# Patient Record
Sex: FEMALE | Race: WHITE
[De-identification: names, ages, dates, MRNs, and addresses within clinical notes are randomized per-mention and may not be internally consistent; named-entity substitution may affect disease eponyms.]

---

## 2017-01-11 ENCOUNTER — HOSPITAL ENCOUNTER (OUTPATIENT)
Dept: HOSPITAL 53 - M LAB | Age: 52
End: 2017-01-11
Attending: THORACIC SURGERY (CARDIOTHORACIC VASCULAR SURGERY)
Payer: COMMERCIAL

## 2017-01-11 DIAGNOSIS — Z01.818: Primary | ICD-10-CM

## 2017-01-11 LAB
ANION GAP SERPL CALC-SCNC: 7 MEQ/L (ref 8–16)
BASE EXCESS BLDA CALC-SCNC: 2 MMOL/L (ref -2–2)
BUN SERPL-MCNC: 17 MG/DL (ref 7–18)
CALCIUM SERPL-MCNC: 9.4 MG/DL (ref 8.5–10.1)
CHLORIDE SERPL-SCNC: 104 MEQ/L (ref 98–107)
CO2 BLDA CALC-SCNC: 28.1 MEQ/L (ref 22–29)
CO2 SERPL-SCNC: 31 MEQ/L (ref 21–32)
CREAT SERPL-MCNC: 0.84 MG/DL (ref 0.55–1.02)
ERYTHROCYTE [DISTWIDTH] IN BLOOD BY AUTOMATED COUNT: 13.5 % (ref 11.5–14.5)
GFR SERPL CREATININE-BSD FRML MDRD: > 60 ML/MIN/{1.73_M2} (ref 58–?)
GLUCOSE SERPL-MCNC: 114 MG/DL (ref 70–105)
HCO3 BLDA-SCNC: 26.8 MEQ/L (ref 22–26)
HCO3 STD BLDA-SCNC: 26.2 MEQ/L (ref 22–26)
INR PPP: 0.93
MCH RBC QN AUTO: 32.5 PG (ref 27–33)
MCHC RBC AUTO-ENTMCNC: 33.4 G/DL (ref 32–36.5)
MCV RBC AUTO: 97.3 FL (ref 80–96)
PCO2 BLDA: 42.7 MMHG (ref 35–45)
PH BLDA: 7.42 UNITS (ref 7.35–7.45)
PLATELET # BLD AUTO: 150 K/MM3 (ref 150–450)
PO2 BLDA: 94.2 MMHG (ref 75–100)
POTASSIUM SERPL-SCNC: 4.6 MEQ/L (ref 3.5–5.1)
SODIUM SERPL-SCNC: 142 MEQ/L (ref 136–145)
WBC # BLD AUTO: 6.2 K/MM3 (ref 4–10)

## 2017-01-11 NOTE — ECGEPIP
Stationary ECG Study

                              [facility]

                                       

                                       Test Date:    2017

Pat Name:     DARRYL GOLDMAN                Department:   

Patient ID:   [id number]                 Room:         -

Gender:       F                        Technician:   DENZEL

:          1965               Requested By: Tan AGUAYO

Order Number: [id number]     Reading MD:   Goyo Dunbar

                                 Measurements

Intervals                              Axis          

Rate:         64                       P:            61

NY:           155                      QRS:          60

QRSD:         86                       T:            41

QT:           408                                    

QTc:          421                                    

                           Interpretive Statements

SINUS RHYTHM

Comparison tracing not on file

Electronically Signed On 2017 11:12:40 EST by Goyo Dunbar

## 2017-01-11 NOTE — REP
Clinical:  Preoperative assessment .

 

Comparison: 10/16/2015 .

 

Technique:  PA and lateral.

 

Findings:

The mediastinum and cardiac silhouette are normal.  Vvozso-K-Baat with tip in the

SVC.  The lung fields are clear and without acute consolidation, effusion, or

pneumothorax.  The skeletal structures are intact and normal.

 

Impression:

1.   No acute cardiopulmonary process.

 

 

Signed by

Tani Hayden MD 01/11/2017 09:20 A

## 2017-01-16 ENCOUNTER — HOSPITAL ENCOUNTER (OUTPATIENT)
Dept: HOSPITAL 53 - M RAD | Age: 52
End: 2017-01-16
Attending: THORACIC SURGERY (CARDIOTHORACIC VASCULAR SURGERY)
Payer: COMMERCIAL

## 2017-01-16 DIAGNOSIS — R91.8: Primary | ICD-10-CM

## 2017-01-16 NOTE — REP
CT study of the chest with IV contrast:

 

History:  Ampullary carcinoma with lung metastases.

 

Comparison is made with prior studies.  The most recent of these is a chest CT

study from September 26, 2016.  Comparison is also made with August 2, 2016, May

23, 2016, and April 8, 2016.

 

CT findings:  There is a right internal jugular vein Gmwfoy-C-Bnku catheter.

There is no evidence of pleural or pericardial effusion.  No mediastinal

adenopathy or hilar adenopathy is appreciated.  No adrenal mass lesion is seen.

There are clips and sutures in the upper abdomen.  No hepatic mass lesion is

seen.  Bone window settings show no bony destructive lesion.

 

There are however innumerable metastatic pulmonary nodules.  The largest of these

is a somewhat spiculated mass in the left lower lobe.  This measures 2.7 cm by

2.3 cm today.  On September 26, 2016 study it measured 2.3 by 1.8 cm. The nearby

tumor deposit adjacent to the left posterolateral pericardium in the left lower

lobe is again seen.  This is probably unchanged.

 

There are numerous other nodules which are increased in size compared to the most

recent prior study of September 26, 2016.  These include a nodule in the right

middle lobe, which measures 10 mm today and was previously 7 mm.  This is seen on

page 62 of 108 of series 301 of today's exam. There is a 6 mm nodule in the left

lower lobe on image 76 of 108 in series 301 of today's examination which was 3 mm

in greatest diameter in September 2016.  There are multiple other nodules which

have grown similarly.  I do not see any definitely new nodules.

 

Impression:

 

Progressive pulmonary metastatic disease.  Innumerable pulmonary nodules are

seen.  Numerous nodules have increased in size since the most recent prior study

of September 26, 2016.

 

 

Signed by

Yaya Chauhan MD 01/17/2017 08:00 A

## 2017-01-31 ENCOUNTER — HOSPITAL ENCOUNTER (EMERGENCY)
Dept: HOSPITAL 53 - M ED | Age: 52
Discharge: HOME | End: 2017-01-31
Payer: COMMERCIAL
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
## 2017-01-31 DIAGNOSIS — Z91.041: ICD-10-CM

## 2017-01-31 DIAGNOSIS — Z79.899: ICD-10-CM

## 2017-01-31 DIAGNOSIS — C25.9: ICD-10-CM

## 2017-01-31 DIAGNOSIS — Z87.891: ICD-10-CM
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
## 2017-01-31 DIAGNOSIS — T45.1X5A: ICD-10-CM
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
## 2017-01-31 DIAGNOSIS — R22.0: Primary | ICD-10-CM

## 2017-01-31 DIAGNOSIS — C34.90: ICD-10-CM

## 2017-01-31 NOTE — EDDOCDS
Physician Documentation                                                                           

[facility]                                                                         

Name: Florence Cotter                                                                                   

Age: 51 yrs                                                                                       

Sex: Female                                                                                       

: 1965                                                                                   

MRN: [id number]                                                                                     

Arrival Date: 2017                                                                          

Time: 15:18                                                                                       

Account#: [id number]                                                                              

Bed 8                                                                                             

Private MD: Aarti Hernandez P.                                                                  

Disposition:                                                                                      

17 17:31 Discharged to Home/Self Care. Impression: Allergy status to other drugs,           

medicaments and biological substances status.                                                   

- Condition is Stable.                                                                            

- Discharge Instructions: Allergies.                                                              

- Prescriptions for Benadryl 25 mg Oral Capsule - take 1 capsule by ORAL route every 6            

hours As needed; 30 tablet. Prednisone 20 mg Oral Tablet - take 1 tablet by ORAL                

route once daily for 5 days; 5 tablet.                                                          

- Medication Reconciliation, Local Pharmacy Hours form.                                           

- Follow up: Aarti Hernandez; When: Tomorrow; Reason: Recheck today's complaints,                

Continuance of care.                                                                            

- Problem is an acute exacerbation.                                                               

- Symptoms have improved.                                                                         

                                                                                                  

                                                                                                  

                                                                                                  

Historical:                                                                                       

- Allergies: IV Dyepossibly;                                                                      

- Home Meds:                                                                                      

1. Protonix 40 mg Oral grps 1 packet 2 times per day                                            

2. prochlorperazine maleate 10 mg Oral tab 1 tab 3 times per day                                

3. chemo regimen today *                                                                        

- PMHx: Cancer, Lung - Left; Cancer, Pancreas;                                                    

- PSHx: wipple procedure;                                                                         

- Social history: Smoking status: Patient states former smoker of tobacco. No barriers            

to communication noted, The patient speaks fluent English, Speaks appropriately for             

age.                                                                                            

- Family history: No immediate family members are acutely ill.                                    

- : The pt / caregiver states he / she is not on anticoagulants. Home medication list             

is obtained from the patient.                                                                   

- Exposure Risk Screening:: None identified.                                                      

                                                                                                  

                                                                                                  

Vital Signs:                                                                                      

                                                                                             

15:20  / 93; Pulse 83; Resp 20; Temp 97.7; Pulse Ox 100% on R/A; Weight 63.5 kg /       elp 

      139.99 lbs (R); Height 5 ft. 6 in. (167.64 cm) (R);                                         

18:01  / 76; Pulse 84; Resp 17; Temp 98.3(T); Pulse Ox 99% ;                            mb9 

15:20 Body Mass Index 22.60 (63.50 kg, 167.64 cm)                                             elp 

                                                                                                  

MDM:                                                                                              

16:18 diphenhydrAMINE 25 mg PO once ordered.                                                  sebastian  

16:18 predniSONE 20 mg PO once; administer with food or milk ordered.                         sebastian  

17:22 Financial registration complete.                                                        [email or web address] 

18:01 heparin 100units/mL flush (infusaport) 5 ml IVP once; flush first with 10mL of NS       mb9 

      followed by heparin ordered.                                                                

                                                                                                  

Administered Medications:                                                                         

16:50 Drug: diphenhydrAMINE 25 mg [diphenhydramine 25 mg capsule (1 caps)] Route: PO;         mb9 

16:51 Drug: predniSONE 20 mg [prednisone 5 mg tablet (4 tabs)] Route: PO;                     mb9 

18:01 Drug: heparin 100units/mL flush (infusaport) 5 ml [heparin, porcine (PF) 10 unit/mL     mb9 

      intravenous syringe (5 mL)] Route: IVP; Site: Implantable Access Device;                    

                                                                                                  

                                                                                                  

Signatures:                                                                                       

Bam Mcarthur, FNP                       FNP  Vee Dennison RN                    RN   hs1                                                  

Giovanni Andrade RN                       RN   mb9                                                  

Renetta Ernst                               [facility]                                                  

                                                                                                  

**************************************************************************************************

JESS

## 2017-01-31 NOTE — EDDOCDS
Nurse's Notes                                                                                     

[facility]                                                                         

Name: Florence Cotter                                                                                   

Age: 51 yrs                                                                                       

Sex: Female                                                                                       

: 1965                                                                                   

MRN: [id number]                                                                                     

Arrival Date: 2017                                                                          

Time: 15:18                                                                                       

Account#: [id number]                                                                              

Bed 8                                                                                             

Private MD: Aarti Hernandez P.                                                                  

Diagnosis: Allergy status to other drugs, medicaments and biological substances status            

                                                                                                  

Presentation:                                                                                     

                                                                                             

15:25 Presenting complaint: Patient states: is being treated at present by oncology and is    hs1 

      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 Patient states that her          

      tongue feels funny and that her lips feel tingly. Patient concerned for allergic            

      reaction. Patient has paused Chemo regimen machine at present - infusaport noted to         

      left upper chest. Onset: The symptoms/episode began/occurred suddenly. The patient has      

      a history of a previous allergic reaction. The previous allergic reaction was               

      localized. Anaphylaxis evaluation, the patient reports or I have noted the following        

      symptoms which indicate a significant risk of anaphylaxis: no signs or symptoms of          

      anaphylaxis were noted. Adult Sepsis Screening: The patient does not have new or            

      worsening altered mentation. Patient's respiratory rate is less than 22. Systolic blood     

      pressure is greater than 100. Patient has a qSOFA score of 0- Negative Sepsis Screen.       

      Suicide/Homicide risk assessment- the patient denies having any suicidal and/or             

      homicidal ideations and does not present with any other emotional, behavioral or mental     

      health complaints.  Status: Patient is not a  or              

      dependent. Transition of care: patient was not received from another setting of care.       

15:25 Acuity: CARINE Level 3                                                                     hs1 

15:25 Method Of Arrival: Walkin/Carried/Asstd                                                 hs1 

                                                                                                  

Triage Assessment:                                                                                

15:32 General: Appears in no apparent distress, Behavior is anxious, cooperative. Pain:       hs1 

      Location: throat Quality of pain is described as razor blades. HIV screening NA for         

      this visit Offered previously. Respiratory: Reports no respiratory complaints.              

                                                                                                  

Historical:                                                                                       

- Allergies: IV Dyepossibly;                                                                      

- Home Meds:                                                                                      

1. Protonix 40 mg Oral grps 1 packet 2 times per day                                            

2. prochlorperazine maleate 10 mg Oral tab 1 tab 3 times per day                                

3. chemo regimen today *                                                                        

- PMHx: Cancer, Lung - Left; Cancer, Pancreas;                                                    

- PSHx: wipple procedure;                                                                         

- Social history: Smoking status: Patient states former smoker of tobacco. No barriers            

to communication noted, The patient speaks fluent English, Speaks appropriately for             

age.                                                                                            

- Family history: No immediate family members are acutely ill.                                    

- : The pt / caregiver states he / she is not on anticoagulants. Home medication list             

is obtained from the patient.                                                                   

- Exposure Risk Screening:: None identified.                                                      

                                                                                                  

                                                                                                  

Screenin:01 Screening information is obtained from the patient. Fall risk: No risks identified.     mb9 

      Assistance ADL's: requires no assistance with activities of daily living. Abuse/DV          

      Screen: The patient / caregiver reports he/she is: not in a situation that causes fear,     

      pain or injury. Nutritional screening: No deficits noted. Advance Directives: There is      

      no active DNR order. home support is adequate.                                              

                                                                                                  

Assessment:                                                                                       

15:50 General: Appears in no apparent distress, Behavior is appropriate for age, cooperative. mb9 

      Pain: Denies pain. Respiratory: Airway is patent Respiratory effort is even, unlabored,     

      Breath sounds are clear bilaterally. Derm: Reports pt states, "it felt like my tongue       

      was swollen and it felt like razor blades when I would swallow. It feels like its           

      better now".                                                                                

17:08 Reassessment: Patient appears in no apparent distress at this time. Patient states      mb9 

      symptoms have improved. General: Behavior is fussy. Pain: Denies pain. Respiratory:         

      Airway is patent Respiratory effort is even, unlabored.                                     

18:00 Reassessment: Patient appears in no apparent distress at this time. Patient states      mb9 

      symptoms have improved. Adult Sepsis Screening: The patient does not have new or            

      worsening altered mentation. Patient's respiratory rate is less than 22. Systolic blood     

      pressure is greater than 100. Patient has a qSOFA score of 0- Negative Sepsis Screen.       

      General: Appears in no apparent distress, Behavior is appropriate for age, cooperative.     

      Respiratory: Airway is patent Respiratory effort is even, unlabored.                        

                                                                                                  

Vital Signs:                                                                                      

15:20  / 93; Pulse 83; Resp 20; Temp 97.7; Pulse Ox 100% on R/A; Weight 63.5 kg (R);    elp 

      Height 5 ft. 6 in. (167.64 cm) (R);                                                         

18:01  / 76; Pulse 84; Resp 17; Temp 98.3(T); Pulse Ox 99% ;                            mb9 

15:20 Body Mass Index 22.60 (63.50 kg, 167.64 cm)                                             elp 

                                                                                                  

Vitals:                                                                                           

15:20 Log In Time: 2017 at 15:18. RN notified that patient meets Red Flag         elp 

      criteria.                                                                                   

                                                                                                  

ED Course:                                                                                        

15:20 Patient visited by Rhona Solis PCA.                                                  elp 

15:20 Aarti Hernandez is Private Physician.                                                  elp 

15:20 Patient moved to Waiting                                                                elp 

15:21 Patient visited by Rhona Solis PCA.                                                  elp 

15:22 Ambar Smart,RN is Primary Nurse.                                                         elp 

15:22 Patient moved to 8                                                                      elp 

15:29 Triage Initiated                                                                        hs1 

15:53 [facility]                                                              ke  

15:53 Patient visited by Bam Mcarthur FNP.                                                   ke  

15:53 Patient visited by aBm Mcarthur FNP.                                                   ke  

16:23 Patient visited by Bam Mcarthur FNP.                                                   ke  

16:58 Patient visited by Bam Mcarthur FNP.                                                   ke  

17:30 Patient visited by Bam Mcarthur FNP.                                                   ke  

17:31 Aarti Hernandez is Referral Physician.                                                 ke  

18:01 The patient / caregiver is instructed regarding the plan of care and ED course. Patient mb9 

      has correct armband on for positive identification.                                         

18:01 Discontinued pt had 20 gauge degroot neeble in place to infusaport to left chest. site    mb9 

      discontinued at this time. No procedures done that require assistance.                      

                                                                                                  

Administered Medications:                                                                         

16:50 Drug: diphenhydrAMINE 25 mg [diphenhydramine 25 mg capsule (1 caps)] Route: PO;         mb9 

16:51 Drug: predniSONE 20 mg [prednisone 5 mg tablet (4 tabs)] Route: PO;                     mb9 

18:01 Drug: heparin 100units/mL flush (infusaport) 5 ml [heparin, porcine (PF) 10 unit/mL     mb9 

      intravenous syringe (5 mL)] Route: IVP; Site: Implantable Access Device;                    

                                                                                                  

                                                                                                  

Order Results:                                                                                    

There are currently no results for this order.                                                    

Outcome:                                                                                          

17:31 Discharge ordered by Provider.                                                          ke  

18:01 Discharge Assessment: Patient awake, alert and oriented x 3. No cognitive and/or        mb9 

      functional deficits noted. Patient verbalized understanding of disposition                  

      instructions. patient administered narcotics - no. The following High Risk Discharge        

      criteria are identified: None. Discharged to home ambulatory. Condition: good               

      Condition: stable Condition: improved. Discharge instructions given to patient,             

      Instructed on discharge instructions, follow up and referral plans. medication usage,       

      Demonstrated understanding of instructions, medications, Pt was receptive of discharge      

      instructions/ teaching. Prescriptions given X 2. No special radiology studies were          

      completed. Property :Personal belongings accompany Pt.                                      

18:05 Patient left the ED.                                                                    mb9 

                                                                                                  

Signatures:                                                                                       

Bam Mcarthur FNP FNP ke Sherrill, Hannah, RN                    RN   hs1                                                  

Rhona Solis PCA PCA elp Belles, Michael,ANGELA                       RN   mb9                                                  

                                                                                                  

**************************************************************************************************

MTDD

## 2017-02-02 NOTE — EDDOCDS
Nurse's Notes                                                                                     

[facility]                                                                         

Name: Florence Cotter                                                                                   

Age: 51 yrs                                                                                       

Sex: Female                                                                                       

: 1965                                                                                   

MRN: [id number]                                                                                     

Arrival Date: 2017                                                                          

Time: 15:18                                                                                       

Account#: [id number]                                                                              

Bed 8                                                                                             

Private MD: Aarti Hernandez P.                                                                  

Diagnosis: Allergy status to other drugs, medicaments and biological substances status            

                                                                                                  

Presentation:                                                                                     

                                                                                             

15:25 Presenting complaint: Patient states: is being treated at present by oncology and is    hs1 

      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 Patient states that her          

      tongue feels funny and that her lips feel tingly. Patient concerned for allergic            

      reaction. Patient has paused Chemo regimen machine at present - infusaport noted to         

      left upper chest. Onset: The symptoms/episode began/occurred suddenly. The patient has      

      a history of a previous allergic reaction. The previous allergic reaction was               

      localized. Anaphylaxis evaluation, the patient reports or I have noted the following        

      symptoms which indicate a significant risk of anaphylaxis: no signs or symptoms of          

      anaphylaxis were noted. Adult Sepsis Screening: The patient does not have new or            

      worsening altered mentation. Patient's respiratory rate is less than 22. Systolic blood     

      pressure is greater than 100. Patient has a qSOFA score of 0- Negative Sepsis Screen.       

      Suicide/Homicide risk assessment- the patient denies having any suicidal and/or             

      homicidal ideations and does not present with any other emotional, behavioral or mental     

      health complaints.  Status: Patient is not a  or              

      dependent. Transition of care: patient was not received from another setting of care.       

15:25 Acuity: CARINE Level 3                                                                     hs1 

15:25 Method Of Arrival: Walkin/Carried/Asstd                                                 hs1 

                                                                                                  

Triage Assessment:                                                                                

15:32 General: Appears in no apparent distress, Behavior is anxious, cooperative. Pain:       hs1 

      Location: throat Quality of pain is described as razor blades. HIV screening NA for         

      this visit Offered previously. Respiratory: Reports no respiratory complaints.              

                                                                                                  

Historical:                                                                                       

- Allergies: IV Dyepossibly;                                                                      

- Home Meds:                                                                                      

1. Protonix 40 mg Oral grps 1 packet 2 times per day                                            

2. prochlorperazine maleate 10 mg Oral tab 1 tab 3 times per day                                

3. chemo regimen today *                                                                        

- PMHx: Cancer, Lung - Left; Cancer, Pancreas;                                                    

- PSHx: wipple procedure;                                                                         

- Social history: Smoking status: Patient states former smoker of tobacco. No barriers            

to communication noted, The patient speaks fluent English, Speaks appropriately for             

age.                                                                                            

- Family history: No immediate family members are acutely ill.                                    

- : The pt / caregiver states he / she is not on anticoagulants. Home medication list             

is obtained from the patient.                                                                   

- Exposure Risk Screening:: None identified.                                                      

                                                                                                  

                                                                                                  

Screenin:01 Screening information is obtained from the patient. Fall risk: No risks identified.     mb9 

      Assistance ADL's: requires no assistance with activities of daily living. Abuse/DV          

      Screen: The patient / caregiver reports he/she is: not in a situation that causes fear,     

      pain or injury. Nutritional screening: No deficits noted. Advance Directives: There is      

      no active DNR order. home support is adequate.                                              

                                                                                                  

Assessment:                                                                                       

15:50 General: Appears in no apparent distress, Behavior is appropriate for age, cooperative. mb9 

      Pain: Denies pain. Respiratory: Airway is patent Respiratory effort is even, unlabored,     

      Breath sounds are clear bilaterally. Derm: Reports pt states, "it felt like my tongue       

      was swollen and it felt like razor blades when I would swallow. It feels like its           

      better now".                                                                                

17:08 Reassessment: Patient appears in no apparent distress at this time. Patient states      mb9 

      symptoms have improved. General: Behavior is fussy. Pain: Denies pain. Respiratory:         

      Airway is patent Respiratory effort is even, unlabored.                                     

18:00 Reassessment: Patient appears in no apparent distress at this time. Patient states      mb9 

      symptoms have improved. Adult Sepsis Screening: The patient does not have new or            

      worsening altered mentation. Patient's respiratory rate is less than 22. Systolic blood     

      pressure is greater than 100. Patient has a qSOFA score of 0- Negative Sepsis Screen.       

      General: Appears in no apparent distress, Behavior is appropriate for age, cooperative.     

      Respiratory: Airway is patent Respiratory effort is even, unlabored.                        

                                                                                                  

Vital Signs:                                                                                      

15:20  / 93; Pulse 83; Resp 20; Temp 97.7; Pulse Ox 100% on R/A; Weight 63.5 kg (R);    elp 

      Height 5 ft. 6 in. (167.64 cm) (R);                                                         

18:01  / 76; Pulse 84; Resp 17; Temp 98.3(T); Pulse Ox 99% ;                            mb9 

15:20 Body Mass Index 22.60 (63.50 kg, 167.64 cm)                                             elp 

                                                                                                  

Vitals:                                                                                           

15:20 Log In Time: 2017 at 15:18. RN notified that patient meets Red Flag         elp 

      criteria.                                                                                   

                                                                                                  

ED Course:                                                                                        

15:20 Patient visited by Rhona Solis PCA.                                                  elp 

15:20 Aarti Hernandez is Private Physician.                                                  elp 

15:20 Patient moved to Waiting                                                                elp 

15:21 Patient visited by Rhona Solis PCA.                                                  elp 

15:22 Ambar Smart,RN is Primary Nurse.                                                         elp 

15:22 Patient moved to 8                                                                      elp 

15:29 Triage Initiated                                                                        hs1 

15:53 [facility]                                                              ke  

15:53 Patient visited by Bam Mcarthur FNP.                                                   ke  

15:53 Patient visited by Bam Mcarthur FNP.                                                   ke  

16:23 Patient visited by Bam Mcarthur FNP.                                                   ke  

16:58 Patient visited by Bam Mcarthur FNP.                                                   ke  

17:30 Patient visited by Bam Mcarthur FNP.                                                   ke  

17:31 Aarti Hernandez is Referral Physician.                                                 ke  

18:01 The patient / caregiver is instructed regarding the plan of care and ED course. Patient mb9 

      has correct armband on for positive identification.                                         

18:01 Discontinued pt had 20 gauge degroot neeble in place to infusaport to left chest. site    mb9 

      discontinued at this time. No procedures done that require assistance.                      

18:19 [facility] Payment Agreement was scanned into [employer] and attached to record.               [facility] 

                                                                                             

11:34 T-Sheet-- Draft Copy was scanned into [employer] and attached to record.                   gb  

                                                                                                  

Administered Medications:                                                                         

                                                                                             

16:50 Drug: diphenhydrAMINE 25 mg [diphenhydramine 25 mg capsule (1 caps)] Route: PO;         mb9 

16:51 Drug: predniSONE 20 mg [prednisone 5 mg tablet (4 tabs)] Route: PO;                     mb9 

18:01 Drug: heparin 100units/mL flush (infusaport) 5 ml [heparin, porcine (PF) 10 unit/mL     mb9 

      intravenous syringe (5 mL)] Route: IVP; Site: Implantable Access Device;                    

                                                                                                  

                                                                                                  

Order Results:                                                                                    

There are currently no results for this order.                                                    

Outcome:                                                                                          

17:31 Discharge ordered by Provider.                                                          ke  

18:01 Discharge Assessment: Patient awake, alert and oriented x 3. No cognitive and/or        mb9 

      functional deficits noted. Patient verbalized understanding of disposition                  

      instructions. patient administered narcotics - no. The following High Risk Discharge        

      criteria are identified: None. Discharged to home ambulatory. Condition: good               

      Condition: stable Condition: improved. Discharge instructions given to patient,             

      Instructed on discharge instructions, follow up and referral plans. medication usage,       

      Demonstrated understanding of instructions, medications, Pt was receptive of discharge      

      instructions/ teaching. Prescriptions given X 2. No special radiology studies were          

      completed. Property :Personal belongings accompany Pt.                                      

18:05 Patient left the ED.                                                                    mb9 

                                                                                                  

Signatures:                                                                                       

Bridget Pickens, Reg                  Reg  gb                                                   

Bam Mcarthur, FNP                       FNP  Vee Dennison RN                    RN   hs1                                                  

Rhona Solis, Giovanni Mike RN                       RN   mb9                                                  

Renetta Ernst                                                  

                                                                                                  

**************************************************************************************************



*** Chart Complete ***
JESS

## 2017-02-02 NOTE — EDDOCDS
Physician Documentation                                                                           

[facility]                                                                         

Name: Florence Cotter                                                                                   

Age: 51 yrs                                                                                       

Sex: Female                                                                                       

: 1965                                                                                   

MRN: [id number]                                                                                     

Arrival Date: 2017                                                                          

Time: 15:18                                                                                       

Account#: [id number]                                                                              

Bed 8                                                                                             

Private MD: Aarti Hernandez P.                                                                  

Disposition:                                                                                      

17 17:31 Discharged to Home/Self Care. Impression: Allergy status to other drugs,           

medicaments and biological substances status.                                                   

- Condition is Stable.                                                                            

- Discharge Instructions: Allergies.                                                              

- Prescriptions for Benadryl 25 mg Oral Capsule - take 1 capsule by ORAL route every 6            

hours As needed; 30 tablet. Prednisone 20 mg Oral Tablet - take 1 tablet by ORAL                

route once daily for 5 days; 5 tablet.                                                          

- Medication Reconciliation, Local Pharmacy Hours form.                                           

- Follow up: Aarti Hernandez; When: Tomorrow; Reason: Recheck today's complaints,                

Continuance of care.                                                                            

- Problem is an acute exacerbation.                                                               

- Symptoms have improved.                                                                         

                                                                                                  

                                                                                                  

                                                                                                  

Historical:                                                                                       

- Allergies: IV Dyepossibly;                                                                      

- Home Meds:                                                                                      

1. Protonix 40 mg Oral grps 1 packet 2 times per day                                            

2. prochlorperazine maleate 10 mg Oral tab 1 tab 3 times per day                                

3. chemo regimen today *                                                                        

- PMHx: Cancer, Lung - Left; Cancer, Pancreas;                                                    

- PSHx: wipple procedure;                                                                         

- Social history: Smoking status: Patient states former smoker of tobacco. No barriers            

to communication noted, The patient speaks fluent English, Speaks appropriately for             

age.                                                                                            

- Family history: No immediate family members are acutely ill.                                    

- : The pt / caregiver states he / she is not on anticoagulants. Home medication list             

is obtained from the patient.                                                                   

- Exposure Risk Screening:: None identified.                                                      

                                                                                                  

                                                                                                  

Vital Signs:                                                                                      

                                                                                             

15:20  / 93; Pulse 83; Resp 20; Temp 97.7; Pulse Ox 100% on R/A; Weight 63.5 kg /       elp 

      139.99 lbs (R); Height 5 ft. 6 in. (167.64 cm) (R);                                         

18:01  / 76; Pulse 84; Resp 17; Temp 98.3(T); Pulse Ox 99% ;                            mb9 

15:20 Body Mass Index 22.60 (63.50 kg, 167.64 cm)                                             elp 

                                                                                                  

MDM:                                                                                              

16:18 diphenhydrAMINE 25 mg PO once ordered.                                                  ke  

16:18 predniSONE 20 mg PO once; administer with food or milk ordered.                         ke  

17:22 Financial registration complete.                                                        b 

18:01 heparin 100units/mL flush (infusaport) 5 ml IVP once; flush first with 10mL of NS       mb9 

      followed by heparin ordered.                                                                

18:19 [facility] Payment Agreement was scanned into [employer] and attached to record.               [facility] [place] 

                                                                                             

11:34 T-Sheet-- Draft Copy was scanned into [employer] and attached to record.                   gb  

                                                                                                  

Administered Medications:                                                                         

                                                                                             

16:50 Drug: diphenhydrAMINE 25 mg [diphenhydramine 25 mg capsule (1 caps)] Route: PO;         mb9 

16:51 Drug: predniSONE 20 mg [prednisone 5 mg tablet (4 tabs)] Route: PO;                     mb9 

18:01 Drug: heparin 100units/mL flush (infusaport) 5 ml [heparin, porcine (PF) 10 unit/mL     mb9 

      intravenous syringe (5 mL)] Route: IVP; Site: Implantable Access Device;                    

                                                                                                  

                                                                                                  

Signatures:                                                                                       

Bridget Pickens, Reg                  Reg  gb                                                   

Bam Mcarthur, FNP                       FNP  Vee Dennison RN                    RN   hs1                                                  

Giovanni AndradeRN                       RN   mb9                                                  

Renetta Ernst                                                  

                                                                                                  

The chart was reviewed and I authenticate all verbal orders and agree with the evaluation and 
treatment provided.Attachments:

18:19 [facility] Payment Agreement                                                                [facility] [place] 

                                                                                             

11:34 T-Sheet-- Draft Copy                                                                    gb  

                                                                                                  

**************************************************************************************************



*** Chart Complete ***
MTDD

## 2017-02-02 NOTE — EDDOCDS
Physician Documentation                                                                           

[facility]                                                                         

Name: Florence Cotter                                                                                   

Age: 51 yrs                                                                                       

Sex: Female                                                                                       

: 1965                                                                                   

MRN: [id number]                                                                                     

Arrival Date: 2017                                                                          

Time: 15:18                                                                                       

Account#: [id number]                                                                              

Bed 8                                                                                             

Private MD: Aarti Hernandez P.                                                                  

Disposition:                                                                                      

17 17:31 Discharged to Home/Self Care. Impression: Allergy status to other drugs,           

medicaments and biological substances status.                                                   

- Condition is Stable.                                                                            

- Discharge Instructions: Allergies.                                                              

- Prescriptions for Benadryl 25 mg Oral Capsule - take 1 capsule by ORAL route every 6            

hours As needed; 30 tablet. Prednisone 20 mg Oral Tablet - take 1 tablet by ORAL                

route once daily for 5 days; 5 tablet.                                                          

- Medication Reconciliation, Local Pharmacy Hours form.                                           

- Follow up: Aarti Hernandez; When: Tomorrow; Reason: Recheck today's complaints,                

Continuance of care.                                                                            

- Problem is an acute exacerbation.                                                               

- Symptoms have improved.                                                                         

                                                                                                  

                                                                                                  

                                                                                                  

Historical:                                                                                       

- Allergies: IV Dyepossibly;                                                                      

- Home Meds:                                                                                      

1. Protonix 40 mg Oral grps 1 packet 2 times per day                                            

2. prochlorperazine maleate 10 mg Oral tab 1 tab 3 times per day                                

3. chemo regimen today *                                                                        

- PMHx: Cancer, Lung - Left; Cancer, Pancreas;                                                    

- PSHx: wipple procedure;                                                                         

- Social history: Smoking status: Patient states former smoker of tobacco. No barriers            

to communication noted, The patient speaks fluent English, Speaks appropriately for             

age.                                                                                            

- Family history: No immediate family members are acutely ill.                                    

- : The pt / caregiver states he / she is not on anticoagulants. Home medication list             

is obtained from the patient.                                                                   

- Exposure Risk Screening:: None identified.                                                      

                                                                                                  

                                                                                                  

Vital Signs:                                                                                      

                                                                                             

15:20  / 93; Pulse 83; Resp 20; Temp 97.7; Pulse Ox 100% on R/A; Weight 63.5 kg /       elp 

      139.99 lbs (R); Height 5 ft. 6 in. (167.64 cm) (R);                                         

18:01  / 76; Pulse 84; Resp 17; Temp 98.3(T); Pulse Ox 99% ;                            mb9 

15:20 Body Mass Index 22.60 (63.50 kg, 167.64 cm)                                             elp 

                                                                                                  

MDM:                                                                                              

16:18 diphenhydrAMINE 25 mg PO once ordered.                                                  ke  

16:18 predniSONE 20 mg PO once; administer with food or milk ordered.                         ke  

17:22 Financial registration complete.                                                        b 

18:01 heparin 100units/mL flush (infusaport) 5 ml IVP once; flush first with 10mL of NS       mb9 

      followed by heparin ordered.                                                                

18:19 [facility] Payment Agreement was scanned into [employer] and attached to record.               [facility] 

                                                                                             

11:34 T-Sheet-- Draft Copy was scanned into [employer] and attached to record.                   gb  

                                                                                                  

Administered Medications:                                                                         

                                                                                             

16:50 Drug: diphenhydrAMINE 25 mg [diphenhydramine 25 mg capsule (1 caps)] Route: PO;         mb9 

16:51 Drug: predniSONE 20 mg [prednisone 5 mg tablet (4 tabs)] Route: PO;                     mb9 

18:01 Drug: heparin 100units/mL flush (infusaport) 5 ml [heparin, porcine (PF) 10 unit/mL     mb9 

      intravenous syringe (5 mL)] Route: IVP; Site: Implantable Access Device;                    

                                                                                                  

                                                                                                  

Signatures:                                                                                       

Bridget Pickens, Reg                  Reg  gb                                                   

Bam Mcarthur, FNP                       FNP  Vee Dennison RN                    RN   hs1                                                  

Giovanni AndradeRN                       RN   mb9                                                  

Renetta Ernst                                                  

                                                                                                  

The chart was reviewed and I authenticate all verbal orders and agree with the evaluation and 
treatment provided.Attachments:

18:19 [facility] Payment Agreement                                                                [facility] 

                                                                                             

11:34 T-Sheet-- Draft Copy                                                                    gb  

                                                                                                  

**************************************************************************************************



*** Chart Complete ***
MTDD

## 2017-02-14 ENCOUNTER — HOSPITAL ENCOUNTER (OUTPATIENT)
Dept: HOSPITAL 53 - M LAB REF | Age: 52
End: 2017-02-14
Attending: INTERNAL MEDICINE
Payer: COMMERCIAL

## 2017-02-14 DIAGNOSIS — C24.1: Primary | ICD-10-CM

## 2017-03-14 ENCOUNTER — HOSPITAL ENCOUNTER (OUTPATIENT)
Dept: HOSPITAL 53 - M LAB REF | Age: 52
End: 2017-03-14
Attending: INTERNAL MEDICINE
Payer: COMMERCIAL

## 2017-03-14 DIAGNOSIS — C24.1: Primary | ICD-10-CM

## 2017-03-22 ENCOUNTER — HOSPITAL ENCOUNTER (OUTPATIENT)
Dept: HOSPITAL 53 - M IRPRO | Age: 52
Discharge: HOME | End: 2017-03-22
Attending: INTERNAL MEDICINE
Payer: COMMERCIAL

## 2017-03-22 DIAGNOSIS — C25.9: Primary | ICD-10-CM

## 2017-03-22 DIAGNOSIS — C79.9: ICD-10-CM

## 2017-03-22 PROCEDURE — 36598 INJ W/FLUOR EVAL CV DEVICE: CPT

## 2017-03-22 NOTE — REPKIM
CLINICAL HISTORY:  Patient with metastatic pancreatic ca is receiving ongoing 
chemotherapy via right IJ chest [id number]. Patient presents for suspected 
port malfunction.  



PROCEDURE PERFORMED:  Contrast injection via the pre-existing chest port



INTERVENTIONALIST: Gregory Westfall MD



CONSENT:  The risks, benefits and alternatives to the procedure were explained 
to the patient and informed written consent was obtained.



CONTRAST: 5 mL Isovue 300 

EBL: None



PROCEDURE/FINDINGS:  The patient was brought to the interventional radiology 
suite and was positioned supine on the table. Time out procedure was performed. 
The right chest was prepped and draped in the usual sterile fashion. Initial 
fluoroscopy showed the pre-existing right chest Gjmotb-P-Tmle with its tip at 
the SVC. The fluoroscopy showed the catheter in a satisfactory course with no 
evidence of kink. The port was accessed using a 22-gauge Live needle. Contrast 
was injected and DSA images were obtained. This showed the port is patent with 
no evidence of extravasation of contrast. The port was then flushed with 
saline. Heparin (100 units/mL) was locked in the port. The Live needle was 
removed. A sterile dressing was applied.   



The patient tolerated the procedure well with no immediate complications.



This procedure was performed using fluoroscopy.



Dr. Westfall was present.



IMPRESSION:  The pre-existing right chest Uhflrx-S-Rslk catheter is patent in a 
satisfactory course with its tip at the SVC. No evidence of leak. The port 
aspirates and flushes freely. The chest mewxyz-f-kvev is ready for use.



cc: Aarti Hernandez MD
[facility]

## 2017-04-11 ENCOUNTER — HOSPITAL ENCOUNTER (OUTPATIENT)
Dept: HOSPITAL 53 - M LAB REF | Age: 52
End: 2017-04-11
Attending: INTERNAL MEDICINE
Payer: COMMERCIAL

## 2017-04-11 DIAGNOSIS — C24.1: Primary | ICD-10-CM

## 2017-04-17 ENCOUNTER — HOSPITAL ENCOUNTER (OUTPATIENT)
Dept: HOSPITAL 53 - M RAD | Age: 52
End: 2017-04-17
Attending: INTERNAL MEDICINE
Payer: COMMERCIAL

## 2017-04-17 DIAGNOSIS — C24.1: Primary | ICD-10-CM

## 2017-04-18 NOTE — REP
Clinical:  Metastatic ampullary carcinoma for followup.

 

Technique:  Axial contrast enhanced images from the lung bases to the pubic

symphysis using oral and 100 ml Isovue 370 intravenous contrast material along

with precontrast and delayed images of the abdomen as well as coronal and

sagittal re-formations.

 

Comparison:  4/08/16.

 

Findings:

The patient is again noted to be status post Whipple procedure and minuscule

amount of residual pneumobilia in the central left intrahepatic biliary tree is

again noted.  The pancreas itself appears stable with chronic atrophic change as

well as ductal dilatation possibly secondary to a stable 11 mm calcification in

the head/neck region.  The previously noted acute pancreatitis with inflammatory

stranding involving the tail of pancreas and splenic hilum has completely

resolved.  Small lymph node in the region of the splenic hilum measures 7 mm and

unchanged.  No significant new adenopathy, fluid, stranding, or mass lesion is

identified.  Liver, spleen, bilateral adrenal glands and kidneys appear normal.

The enteric system is without obstruction or acute inflammatory process although

subtle likely chronic mural thickening to the sigmoid colon may warrant

colonoscopy evaluation.  Pelvis demonstrates normal bladder and age-appropriate

uterus/adnexa. No pelvic fluid or ascites.  No significant retroperitoneal or

intraperitoneal adenopathy.  No free air.  No obvious mass lesion.  Surrounding

musculoskeletal structures demonstrate age-related changes without focal osseous

abnormality.

 

Impression:

1.  Stable changes related to prior Whipple procedure and history of ampullary

carcinoma without evidence for new, acute mass lesion, ascites, adenopathy, or

infectious/inflammatory changes.  Previously identified presumed pancreatitis on

examination dated 04/08/2016 has resolved.

2.  Mild and possibly chronic mural thickening to the sigmoid colon may warrant

colonoscopy evaluation.

 

 

 

Signed by

Tani Hayden MD 04/18/2017 06:24 A

## 2017-04-18 NOTE — REP
Clinical: Metastatic ampullary carcinoma for followup.

 

Technique:  Axial contrast enhanced images from the thoracic inlet to the upper

abdomen using 100 ml Isovue 370 intravenous contrast material with coronal and

sagittal re-formations.

 

Comparison:  01/16/2017, 09/26/2016, 08/02/2016.

 

Findings:

While multiple bilateral metastatic foci are identified, there is an overall

appearance of improvement.  Specifically, no significant new nodule is identified

and previously noted and measured nodules now appear decreased in size and/or

less conspicuous.  Specifically as example, the primary lesion in the left lower

lobe previously appeared solid and measured greater than 2.7 cm while currently

appearing partially necrotic and measuring up to approximately 1.9 cm maximal

diameter.  Similarly, a left lower lobe lesion in the deep posterior sulcus

previously measuring 1.3 cm maximal diameter and currently measures 1.0 cm

maximal diameter.  Other scattered bilateral lesions appear less dense and/or

decreased in size.  No pleural effusion/reaction or pneumothorax noted.

Tracheobronchial tree is patent.  Axillary and mediastinal lymph nodes are

nonspecific in appearance.  Heart/pericardium appear normal.  Thoracic aorta and

pulmonary vasculature is unremarkable.  Surrounding musculoskeletal structures

are intact without focal osseous abnormality noted.

 

Impression:

1.  Improved appearance when compared to prior examination with specific,

detailed lesion as described above appearing decreased in size and less

conspicuous in nature.

2.  No new significant pulmonary nodule/mass lesion noted.

 

 

Signed by

Tani Hayden MD 04/18/2017 06:11 A

## 2017-04-20 ENCOUNTER — HOSPITAL ENCOUNTER (OUTPATIENT)
Dept: HOSPITAL 53 - M OPCLI4PV | Age: 52
LOS: 1 days | Discharge: HOME | End: 2017-04-21
Attending: INTERNAL MEDICINE
Payer: COMMERCIAL

## 2017-04-20 VITALS — SYSTOLIC BLOOD PRESSURE: 125 MMHG | DIASTOLIC BLOOD PRESSURE: 72 MMHG

## 2017-04-20 VITALS — BODY MASS INDEX: 21.58 KG/M2 | HEIGHT: 66 IN | WEIGHT: 134.26 LBS

## 2017-04-20 VITALS — SYSTOLIC BLOOD PRESSURE: 126 MMHG | DIASTOLIC BLOOD PRESSURE: 71 MMHG

## 2017-04-20 DIAGNOSIS — C24.1: ICD-10-CM

## 2017-04-20 DIAGNOSIS — D64.9: Primary | ICD-10-CM

## 2017-04-20 PROCEDURE — 86901 BLOOD TYPING SEROLOGIC RH(D): CPT

## 2017-04-20 PROCEDURE — 86900 BLOOD TYPING SEROLOGIC ABO: CPT

## 2017-04-20 PROCEDURE — 86920 COMPATIBILITY TEST SPIN: CPT

## 2017-04-20 PROCEDURE — 86870 RBC ANTIBODY IDENTIFICATION: CPT

## 2017-04-20 PROCEDURE — 36430 TRANSFUSION BLD/BLD COMPNT: CPT

## 2017-04-20 PROCEDURE — 86850 RBC ANTIBODY SCREEN: CPT

## 2017-04-21 VITALS — SYSTOLIC BLOOD PRESSURE: 141 MMHG | DIASTOLIC BLOOD PRESSURE: 86 MMHG

## 2017-04-21 VITALS — DIASTOLIC BLOOD PRESSURE: 68 MMHG | SYSTOLIC BLOOD PRESSURE: 126 MMHG

## 2017-04-21 VITALS — DIASTOLIC BLOOD PRESSURE: 78 MMHG | SYSTOLIC BLOOD PRESSURE: 124 MMHG

## 2017-05-15 ENCOUNTER — HOSPITAL ENCOUNTER (OUTPATIENT)
Dept: HOSPITAL 53 - M LAB REF | Age: 52
End: 2017-05-15
Attending: INTERNAL MEDICINE
Payer: COMMERCIAL

## 2017-05-15 DIAGNOSIS — C24.1: Primary | ICD-10-CM

## 2017-06-06 ENCOUNTER — HOSPITAL ENCOUNTER (OUTPATIENT)
Dept: HOSPITAL 53 - M LAB REF | Age: 52
End: 2017-06-06
Attending: INTERNAL MEDICINE
Payer: COMMERCIAL

## 2017-06-06 DIAGNOSIS — C24.1: Primary | ICD-10-CM

## 2017-07-10 ENCOUNTER — HOSPITAL ENCOUNTER (OUTPATIENT)
Dept: HOSPITAL 53 - M RAD | Age: 52
End: 2017-07-10
Attending: INTERNAL MEDICINE
Payer: COMMERCIAL

## 2017-07-10 DIAGNOSIS — C24.1: Primary | ICD-10-CM

## 2017-07-10 NOTE — REP
Clinical:  Ampullary carcinoma.  Post therapeutic follow-up evaluation.

 

Comparison:  04/17/2017.

 

Technique:  Axial contrast enhanced images from the thoracic inlet to the pubic

symphysis using 100 ml Isovue 370 intravenous contrast material with coronal and

sagittal re-formations.

 

Findings:

The bilateral lung fields are well-aerated and no consolidation, pleural effusion

or pneumothorax is appreciated.  The tracheobronchial tree is patent.  No

significant axillary, increased hilar, or mediastinal adenopathy is appreciated;

right hilar lymph nodes are unchanged.  Thoracic aorta, pulmonary vasculature,

and heart/pericardium are normal.

 

Scrutinized evaluation of the lung fields demonstrates no new nodule or mass

lesion.  Previously identified scattered nodules and left lower lobe mass are all

either stable or less conspicuous and demonstrate decreased soft tissue

components.  No nodular densities appreciated greater than 4 mm.  Left lower lobe

metastatic focus measures approximately 14 mm maximal transverse diameter

previously measuring approximately 19 mm.

 

Impression:

1.  Follow-up demonstrates the left lower lobe metastatic focus and small

scattered nodular densities to either have decreased in size and conspicuity or

remains stable.  Specifically, the left lower lobe metastatic focus has decreased

from 19 mm to 14 mm maximal transverse diameter.  No new lesions are identified.

2.  No new acute mediastinal or pleuroparenchymal process.

 

 

Signed by

Tani Hayden MD 07/10/2017 11:51 P

## 2017-07-11 NOTE — REP
Clinical:  Ampullary carcinoma.  Post therapeutic follow-up evaluation.

 

Comparison:  04/17/2017.

 

Technique:  Axial contrast enhanced images from the lung bases to the pubic

symphysis using oral and 100 ml Isovue 370 intravenous contrast material coronal

and sagittal re-formations.

 

Findings:

Liver, spleen, bilateral adrenal glands and kidneys are normal.  The pancreas

demonstrates atrophic appearance with ductal dilatation and proximal calculus

measuring approximately 11 mm.  The patient is noted to be status post Whipple

procedure.  The enteric system is without obstruction or acute inflammatory

process.  Few scattered sigmoid diverticula noted without acute diverticulitis.

Pelvis demonstrates normal bladder and age-appropriate uterus/adnexa.  No pelvic

fluid or ascites.  No free air.  No significant adenopathy.  No obvious mass

lesion.  Abdominal aorta and vasculature appear normal.  Surrounding

musculoskeletal structures are intact and without focal osseous abnormality.

 

Impression:

1.  Findings related to ampullary carcinoma including chronic pancreatic ductal

dilatation and possibly obstructing 11 mm calculus as well as evidence for prior

Whipple procedure.

2.  No evidence for recurrence or metastatic disease.  No adenopathy or ascites.

 

 

Signed by

Tani Hayden MD 07/11/2017 12:06 A

## 2017-08-01 ENCOUNTER — HOSPITAL ENCOUNTER (OUTPATIENT)
Dept: HOSPITAL 53 - M LAB REF | Age: 52
End: 2017-08-01
Attending: INTERNAL MEDICINE
Payer: COMMERCIAL

## 2017-08-01 DIAGNOSIS — C24.1: Primary | ICD-10-CM

## 2017-09-19 ENCOUNTER — HOSPITAL ENCOUNTER (OUTPATIENT)
Dept: HOSPITAL 53 - M LAB REF | Age: 52
End: 2017-09-19
Attending: INTERNAL MEDICINE
Payer: COMMERCIAL

## 2017-09-19 DIAGNOSIS — C24.1: Primary | ICD-10-CM

## 2017-10-19 ENCOUNTER — HOSPITAL ENCOUNTER (OUTPATIENT)
Dept: HOSPITAL 53 - M LAB REF | Age: 52
End: 2017-10-19
Attending: INTERNAL MEDICINE
Payer: COMMERCIAL

## 2017-10-19 DIAGNOSIS — C24.1: Primary | ICD-10-CM

## 2017-10-19 LAB
FOLATE SERPL-MCNC: 21.5 NG/ML
MAGNESIUM SERPL-MCNC: 2.3 MG/DL (ref 1.8–2.4)
VIT B12 SERPL-MCNC: 916 PG/ML

## 2017-11-20 ENCOUNTER — HOSPITAL ENCOUNTER (OUTPATIENT)
Dept: HOSPITAL 53 - M LAB REF | Age: 52
End: 2017-11-20
Attending: INTERNAL MEDICINE
Payer: COMMERCIAL

## 2017-11-20 DIAGNOSIS — C34.90: Primary | ICD-10-CM

## 2017-11-30 ENCOUNTER — HOSPITAL ENCOUNTER (OUTPATIENT)
Dept: HOSPITAL 53 - M RAD | Age: 52
End: 2017-11-30
Attending: INTERNAL MEDICINE
Payer: COMMERCIAL

## 2017-11-30 DIAGNOSIS — C78.00: ICD-10-CM

## 2017-11-30 DIAGNOSIS — C24.1: Primary | ICD-10-CM

## 2017-11-30 NOTE — REP
CT chest with IV contrast:

 

History:  Metastatic ampullary carcinoma.

 

Comparison chest CT study July 10, 2017 and April 17, 2017.

 

CT contrast dose:  100 ml of intravenous Isovue 370 is administered.

 

CT findings:  There are numerous widely distributed bilateral metastatic soft

tissue nodules in the lung fields today.  These are increased in number and in

size when compared to the most recent prior study of July 10, 2017.  This study

had shown some improvement.  Today's findings show progression when compared with

the April 17, 2017 study as well.  The largest lesion is in the left lower lobe,

which measures 23 mm today, 15 mm in July of 2017 and 19 mm in April of 2017.

Multiple new smaller pulmonary nodules are seen bilaterally including several in

the 0.8-0.9 cm range.

 

There is no evidence of pleural or pericardial effusion.  No hilar or mediastinal

mass or adenopathy is observed.  No extrathoracic mass or adenopathy is seen.  No

bony destructive lesion is seen.

 

Impression:

 

Progressive pulmonary metastatic disease.

 

 

Signed by

Yaya Chauhan MD 11/30/2017 04:21 P

## 2017-11-30 NOTE — REP
CT study of the abdomen and pelvis without and with IV contrast:  With oral

contrast.

 

History:  Metastatic ampullary carcinoma.

 

Most recent comparison study is from July 10, 2017.  Comparison is also made from

April 17, 2017 prior CT study.

 

CT contrast dose:  100 mL of Isovue 370 given intravenously.

 

CT findings:  Preliminary digital  radiograph shows an unremarkable bowel

gas pattern.  Lung window settings demonstrate bibasilar pulmonary nodules.

These would be described in detail on chest CT report.  No pleural effusion or

upper abdominal ascites.  The liver and the spleen are normal in size homogeneous

in texture.  The patient is status post a partial Whipple procedure and mild

pneumobilia is again noted as a result.  No adrenal lesion is seen.  No upper

abdominal lymphadenopathy is observed.  There is a 1.1 cm calcification in the

pancreatic head in or adjacent to the main pancreatic duct.  There is atrophy of

the body and tail of the pancreas and dilation of the main pancreatic duct.

These findings are stable chronic.  There is no evidence of portal or splenic

vein thrombosis or other vascular impingement.  No retroperitoneal mass or

adenopathy is seen.  No renal mass is seen.  Gastric jejunostomy suture line is

again seen.  No obstructive gastrointestinal tract lesion is seen.  No uterine or

adnexal abnormality is observed.  Urinary bladder is intact.  No pelvic mass or

adenopathy is seen.  A normal appendix is seen along the posterior pelvic

sidewall.  No abdominal wall defect is seen.  No bony destructive lesion is

appreciated.

 

Impression:

 

Postoperative changes again noted in the upper abdomen.  No evidence of

intra-abdominal disease, recurrence or metastasis.

 

 

Signed by

Yaya Chauhan MD 11/30/2017 04:09 P

## 2018-01-02 ENCOUNTER — HOSPITAL ENCOUNTER (OUTPATIENT)
Dept: HOSPITAL 53 - M LAB REF | Age: 53
End: 2018-01-02
Attending: INTERNAL MEDICINE
Payer: COMMERCIAL

## 2018-01-02 DIAGNOSIS — C24.1: Primary | ICD-10-CM

## 2018-01-02 LAB — CANCER AG19-9 SERPL-ACNC: 26.4 U/ML (ref ?–35)

## 2018-01-02 PROCEDURE — 86301 IMMUNOASSAY TUMOR CA 19-9: CPT

## 2018-01-16 ENCOUNTER — HOSPITAL ENCOUNTER (OUTPATIENT)
Dept: HOSPITAL 53 - M LAB REF | Age: 53
End: 2018-01-16
Attending: INTERNAL MEDICINE
Payer: COMMERCIAL

## 2018-01-16 DIAGNOSIS — Z90.49: ICD-10-CM

## 2018-01-16 DIAGNOSIS — Z90.411: ICD-10-CM

## 2018-01-16 DIAGNOSIS — D12.6: Primary | ICD-10-CM

## 2018-01-16 DIAGNOSIS — C78.00: ICD-10-CM

## 2018-01-16 DIAGNOSIS — C24.1: ICD-10-CM

## 2018-02-13 ENCOUNTER — HOSPITAL ENCOUNTER (OUTPATIENT)
Dept: HOSPITAL 53 - M LAB REF | Age: 53
End: 2018-02-13
Attending: INTERNAL MEDICINE
Payer: COMMERCIAL

## 2018-02-13 DIAGNOSIS — C24.1: Primary | ICD-10-CM

## 2018-02-13 LAB — CANCER AG19-9 SERPL-ACNC: 34.3 U/ML (ref ?–35)

## 2018-02-21 ENCOUNTER — HOSPITAL ENCOUNTER (OUTPATIENT)
Dept: HOSPITAL 53 - M RAD | Age: 53
End: 2018-02-21
Attending: INTERNAL MEDICINE
Payer: COMMERCIAL

## 2018-02-21 DIAGNOSIS — C78.00: ICD-10-CM

## 2018-02-21 DIAGNOSIS — C24.1: Primary | ICD-10-CM

## 2018-05-22 ENCOUNTER — HOSPITAL ENCOUNTER (OUTPATIENT)
Dept: HOSPITAL 53 - M LAB REF | Age: 53
End: 2018-05-22
Attending: INTERNAL MEDICINE
Payer: COMMERCIAL

## 2018-05-22 DIAGNOSIS — C78.00: ICD-10-CM

## 2018-05-22 DIAGNOSIS — C24.1: Primary | ICD-10-CM

## 2018-05-22 LAB — CANCER AG19-9 SERPL-ACNC: 53.1 U/ML (ref ?–35)

## 2018-06-27 ENCOUNTER — HOSPITAL ENCOUNTER (OUTPATIENT)
Dept: HOSPITAL 53 - M RAD | Age: 53
End: 2018-06-27
Attending: INTERNAL MEDICINE
Payer: COMMERCIAL

## 2018-06-27 DIAGNOSIS — C24.1: Primary | ICD-10-CM

## 2018-06-27 DIAGNOSIS — C78.00: ICD-10-CM

## 2018-07-03 ENCOUNTER — HOSPITAL ENCOUNTER (OUTPATIENT)
Dept: HOSPITAL 53 - M LAB REF | Age: 53
End: 2018-07-03
Attending: INTERNAL MEDICINE
Payer: COMMERCIAL

## 2018-07-03 DIAGNOSIS — C78.00: ICD-10-CM

## 2018-07-03 DIAGNOSIS — C24.1: Primary | ICD-10-CM

## 2018-07-03 LAB — CANCER AG19-9 SERPL-ACNC: 63.5 U/ML (ref ?–35)

## 2018-07-03 PROCEDURE — 86301 IMMUNOASSAY TUMOR CA 19-9: CPT

## 2018-07-31 ENCOUNTER — HOSPITAL ENCOUNTER (OUTPATIENT)
Dept: HOSPITAL 53 - M RAD | Age: 53
End: 2018-07-31
Attending: INTERNAL MEDICINE
Payer: COMMERCIAL

## 2018-07-31 DIAGNOSIS — K76.89: ICD-10-CM

## 2018-07-31 DIAGNOSIS — C24.1: Primary | ICD-10-CM

## 2018-07-31 PROCEDURE — 76705 ECHO EXAM OF ABDOMEN: CPT

## 2018-10-01 ENCOUNTER — HOSPITAL ENCOUNTER (OUTPATIENT)
Dept: HOSPITAL 53 - M LAB REF | Age: 53
End: 2018-10-01
Attending: DERMATOLOGY
Payer: COMMERCIAL

## 2018-10-01 DIAGNOSIS — C44.709: ICD-10-CM

## 2018-10-01 DIAGNOSIS — L57.0: Primary | ICD-10-CM

## 2018-10-01 PROCEDURE — 88305 TISSUE EXAM BY PATHOLOGIST: CPT

## 2018-11-01 ENCOUNTER — HOSPITAL ENCOUNTER (OUTPATIENT)
Dept: HOSPITAL 53 - M RAD | Age: 53
End: 2018-11-01
Attending: INTERNAL MEDICINE
Payer: COMMERCIAL

## 2018-11-01 DIAGNOSIS — C24.1: Primary | ICD-10-CM

## 2018-11-13 ENCOUNTER — HOSPITAL ENCOUNTER (OUTPATIENT)
Dept: HOSPITAL 53 - M LAB REF | Age: 53
End: 2018-11-13
Attending: DERMATOLOGY
Payer: COMMERCIAL

## 2018-11-13 DIAGNOSIS — D04.71: Primary | ICD-10-CM

## 2018-11-27 ENCOUNTER — HOSPITAL ENCOUNTER (EMERGENCY)
Dept: HOSPITAL 53 - M ED | Age: 53
Discharge: HOME | End: 2018-11-27
Payer: COMMERCIAL

## 2018-11-27 DIAGNOSIS — C78.00: ICD-10-CM

## 2018-11-27 DIAGNOSIS — Z88.5: ICD-10-CM

## 2018-11-27 DIAGNOSIS — Z79.899: ICD-10-CM

## 2018-11-27 DIAGNOSIS — R47.9: Primary | ICD-10-CM

## 2018-11-27 DIAGNOSIS — G62.9: ICD-10-CM

## 2018-11-27 DIAGNOSIS — M54.9: ICD-10-CM

## 2018-11-27 DIAGNOSIS — C25.9: ICD-10-CM

## 2018-11-27 DIAGNOSIS — T50.905A: ICD-10-CM

## 2018-11-27 LAB
ALBUMIN/GLOBULIN RATIO: 1.31 (ref 1–1.93)
ALBUMIN: 3.4 GM/DL (ref 3.2–5.2)
ALKALINE PHOSPHATASE: 283 U/L (ref 45–117)
ALT SERPL W P-5'-P-CCNC: 34 U/L (ref 12–78)
AMYLASE SERPL-CCNC: 64 U/L (ref 25–115)
ANION GAP: 7 MEQ/L (ref 8–16)
AST SERPL-CCNC: 27 U/L (ref 7–37)
BASO #: 0 10^3/UL (ref 0–0.2)
BASO %: 0.2 % (ref 0–1)
BILIRUB CONJ SERPL-MCNC: 0.2 MG/DL (ref 0–0.2)
BILIRUBIN,TOTAL: 0.7 MG/DL (ref 0.2–1)
BLOOD UREA NITROGEN: 8 MG/DL (ref 7–18)
CALCIUM LEVEL: 8.1 MG/DL (ref 8.5–10.1)
CARBON DIOXIDE LEVEL: 26 MEQ/L (ref 21–32)
CHLORIDE LEVEL: 108 MEQ/L (ref 98–107)
CREATININE FOR GFR: 0.75 MG/DL (ref 0.55–1.3)
EOS #: 0 10^3/UL (ref 0–0.5)
EOSINOPHIL NFR BLD AUTO: 0 % (ref 0–3)
GFR SERPL CREATININE-BSD FRML MDRD: > 60 ML/MIN/{1.73_M2} (ref 51–?)
GLUCOSE, FASTING: 143 MG/DL (ref 70–100)
HEMATOCRIT: 35.5 % (ref 36–47)
HEMOGLOBIN: 11.3 G/DL (ref 12–15.5)
IMMATURE GRANULOCYTE %: 1.6 % (ref 0–3)
IMMATURE PLATELET FRACTION %: 5.6 % (ref 0–9.6)
LIPASE: 56 U/L (ref 73–393)
LYMPH #: 0.2 10^3/UL (ref 1.5–4.5)
LYMPH %: 2.3 % (ref 24–44)
MEAN CORPUSCULAR HEMOGLOBIN: 32.3 PG (ref 27–33)
MEAN CORPUSCULAR HGB CONC: 31.8 G/DL (ref 32–36.5)
MEAN CORPUSCULAR VOLUME: 101.4 FL (ref 80–96)
MONO #: 0.1 10^3/UL (ref 0–0.8)
MONO %: 0.6 % (ref 0–5)
NEUTROPHILS #: 7.9 10^3/UL (ref 1.8–7.7)
NEUTROPHILS %: 95.3 % (ref 36–66)
NRBC BLD AUTO-RTO: 0 % (ref 0–0)
PLATELET COUNT, AUTOMATED: 87 10^3/UL (ref 150–450)
POSITIVE DIFF: (no result)
POTASSIUM SERUM: 3.8 MEQ/L (ref 3.5–5.1)
RED BLOOD COUNT: 3.5 10^6/UL (ref 4–5.4)
RED CELL DISTRIBUTION WIDTH: 13.7 % (ref 11.5–14.5)
SODIUM LEVEL: 141 MEQ/L (ref 136–145)
TOTAL PROTEIN: 6 GM/DL (ref 6.4–8.2)
WHITE BLOOD COUNT: 8.3 10^3/UL (ref 4–10)

## 2018-11-27 RX ADMIN — Medication 1 UNITS: at 19:23

## 2018-11-27 RX ADMIN — METOCLOPRAMIDE 1 MG: 5 INJECTION, SOLUTION INTRAMUSCULAR; INTRAVENOUS at 17:15

## 2018-11-27 RX ADMIN — MORPHINE SULFATE 1 MG: 2 INJECTION, SOLUTION INTRAMUSCULAR; INTRAVENOUS at 15:29

## 2018-11-27 RX ADMIN — ONDANSETRON 1 MG: 2 INJECTION INTRAMUSCULAR; INTRAVENOUS at 15:00

## 2018-11-27 RX ADMIN — SODIUM CHLORIDE 1 ML: 9 INJECTION, SOLUTION INTRAMUSCULAR; INTRAVENOUS; SUBCUTANEOUS at 19:23

## 2018-11-27 RX ADMIN — MORPHINE SULFATE 1 MG: 2 INJECTION, SOLUTION INTRAMUSCULAR; INTRAVENOUS at 15:03

## 2018-11-27 RX ADMIN — SODIUM CHLORIDE 1 MLS/HR: 9 INJECTION, SOLUTION INTRAVENOUS at 14:48

## 2018-12-28 ENCOUNTER — HOSPITAL ENCOUNTER (OUTPATIENT)
Dept: HOSPITAL 53 - M LAB REF | Age: 53
End: 2018-12-28
Payer: COMMERCIAL

## 2018-12-28 DIAGNOSIS — Z12.4: Primary | ICD-10-CM

## 2018-12-28 PROCEDURE — 87624 HPV HI-RISK TYP POOLED RSLT: CPT

## 2019-01-02 LAB — HPV LOW VOL RFLX: (no result)

## 2019-02-28 ENCOUNTER — HOSPITAL ENCOUNTER (OUTPATIENT)
Dept: HOSPITAL 53 - M RAD | Age: 54
End: 2019-02-28
Attending: INTERNAL MEDICINE
Payer: COMMERCIAL

## 2019-02-28 DIAGNOSIS — C78.00: ICD-10-CM

## 2019-02-28 DIAGNOSIS — C24.1: Primary | ICD-10-CM

## 2019-02-28 PROCEDURE — 71260 CT THORAX DX C+: CPT

## 2019-02-28 PROCEDURE — 74177 CT ABD & PELVIS W/CONTRAST: CPT

## 2019-02-28 NOTE — REP
CT abdomen and pelvis with IV and oral contrast:

 

History:  Restaging metastatic ampullary adenocarcinoma.

 

CT contrast dose:  100 mL of intravenous Isovue 370.

 

Comparison CT study November 1, 2018 and June 27, 2018.

 

CT findings:  The liver and spleen are normal in size homogeneous in texture.  No

focal liver mass lesion is seen.  Pneumobilia is again noted it is a

postoperative finding.  There is no evidence of mass lesion in the area of the

pancreatic head or body.  There is a large calcification in or adjacent to the

main pancreatic duct again seen.  This calcification measures 12 mm in greatest

diameter.  It is unchanged.  The main pancreatic duct is somewhat dilated in the

body and tail of the remaining pancreas.  There is a small accessory splenule as

before.  No adrenal lesion is seen.  No retroperitoneal mass or adenopathy is

seen.  The kidneys enhance symmetrically and are morphologically intact.  Small

and large intestinal bowel loops are normal in the abdomen and pelvis.  Urinary

bladder is unremarkable.  Uterus is tipped somewhat to the right but appears

intact.  No abdominal wall defect is seen.  No bony destructive lesion is seen.

 

Impression:

 

No evidence of intra-abdominal mass or adenopathy.  Postoperative changes in the

upper abdomen again noted.

 

 

Electronically Signed by

Yaya Chauhan MD 02/28/2019 04:36 P

## 2019-02-28 NOTE — REP
CT chest with IV contrast:

 

History:  Restaging metastatic stage IV ampullary adenocarcinoma.

 

Most recent comparison chest CT study November 1, 2018.  Comparison chest CT from

June 27, 2018 is also reviewed.

 

CT contrast dose:  100 mL of intravenous Isovue 370 is administered.

 

CT findings:  This patient has extensive pulmonary metastatic disease.  There are

innumerable pulmonary nodules bilaterally as before.  I do not believe there are

any new pulmonary nodules.  However, unfortunately almost all of her preexisting

pulmonary nodules have gotten somewhat larger since the most recent scan of

November first 2018.  The largest lesion is again noted in the left lower lobe.

It's previous greatest dimension was 3.4 cm on November 1, 2018.  Today it

measures 3.9 cm and has become confluent with a posteriorly adjacent nodule.  The

above measurement excludes this adjacent nodule.  It has increased in transverse

short-axis dimension from 18 mm to 26 mm since the November 1, 2018 study.  A

left upper lobe nodule measured previously at 13 mm now measures 15 mm in

greatest dimension.  Proportionately similar increases in the patient's other

pulmonary nodules are seen bilaterally.  There is no hilar or mediastinal

adenopathy.  No focal liver lesion is seen.  Pneumobilia is again seen as a

postoperative finding.  No pleural or pericardial effusion is seen.  There is a

right-sided Ibttoc-G-Cxvh catheter again noted.  No bony destructive lesion is

seen.

 

Impression:

 

Interval increase in the size , (although not the number) of the patient's

bilaterally extensive pulmonary metastatic nodules.

 

 

Electronically Signed by

Yaya Chauhan MD 02/28/2019 04:36 P

## 2019-06-04 ENCOUNTER — HOSPITAL ENCOUNTER (OUTPATIENT)
Dept: HOSPITAL 53 - M RAD | Age: 54
End: 2019-06-04
Payer: COMMERCIAL

## 2019-06-04 DIAGNOSIS — Z12.31: Primary | ICD-10-CM

## 2019-06-04 DIAGNOSIS — Z92.21: ICD-10-CM

## 2019-06-04 NOTE — REPMRS
Patient History

Patient is postmenopausal, had previous chemotherapy at age 53, 

has history of other cancer at age 44, and is nulliparous.

Family history of prostate cancer at age 50 or over in maternal 

grandfather, breast cancer at age 50 in maternal aunt.

 

3D TOMOSYNTHESIS WAS PERFORMED.

 

Digital Mammo Screening Bilat: June 4, 2019 - Exam #: 

[id number]

Bilateral CC and MLO view(s) were taken.

 

Technologist: Cassandra Lamb, Technologist

Prior study comparison: January 6, 2016, bilateral digital mammo 

screening bilat performed at [facility].  January 5, 2015, bilateral digital mammo screening bilat performed at 

[facility].

 

FINDINGS: The breast tissue is heterogeneously dense.  This may 

lower the sensitivity of mammography.  There has been no change 

in the appearance of the mammogram from the prior studies.  There

is a moderate amount of residual fibroglandular tissue which is 

fairly symmetric. There is no interval development of dominant 

mass, areas of architectural distortion, or clustered 

microcalcification typical of malignancy.

 

Assessment: BI-RADS/ACR category 1 mammogram. Negative Mammogram.

 

Recommendation

Routine screening mammogram in 1 year (for women over age 40).

This mammogram was interpreted with the aid of an FDA-approved 

computer-aided dectection system.

 

Electronically Signed By: Noam Sabillon MD 06/04/19 0816

## 2019-11-12 ENCOUNTER — HOSPITAL ENCOUNTER (OUTPATIENT)
Dept: HOSPITAL 53 - M LAB REF | Age: 54
End: 2019-11-12
Attending: DERMATOLOGY
Payer: COMMERCIAL

## 2019-11-12 DIAGNOSIS — C44.529: Primary | ICD-10-CM

## 2019-11-21 ENCOUNTER — HOSPITAL ENCOUNTER (OUTPATIENT)
Dept: HOSPITAL 53 - M LAB | Age: 54
End: 2019-11-21
Attending: FAMILY MEDICINE
Payer: COMMERCIAL

## 2019-11-21 DIAGNOSIS — Z00.00: Primary | ICD-10-CM

## 2019-11-21 LAB
ALBUMIN SERPL BCG-MCNC: 3.8 GM/DL (ref 3.2–5.2)
ALT SERPL W P-5'-P-CCNC: 33 U/L (ref 12–78)
BASOPHILS # BLD AUTO: 0 10^3/UL (ref 0–0.2)
BASOPHILS NFR BLD AUTO: 0.4 % (ref 0–1)
BILIRUB SERPL-MCNC: 1.1 MG/DL (ref 0.2–1)
BUN SERPL-MCNC: 14 MG/DL (ref 7–18)
CALCIUM SERPL-MCNC: 8.7 MG/DL (ref 8.5–10.1)
CHLORIDE SERPL-SCNC: 103 MEQ/L (ref 98–107)
CO2 SERPL-SCNC: 31 MEQ/L (ref 21–32)
CREAT SERPL-MCNC: 0.84 MG/DL (ref 0.55–1.3)
EOSINOPHIL # BLD AUTO: 0.1 10^3/UL (ref 0–0.5)
EOSINOPHIL NFR BLD AUTO: 1.7 % (ref 0–3)
EST. AVERAGE GLUCOSE BLD GHB EST-MCNC: 105 MG/DL (ref 60–110)
GFR SERPL CREATININE-BSD FRML MDRD: > 60 ML/MIN/{1.73_M2} (ref 51–?)
GLUCOSE SERPL-MCNC: 102 MG/DL (ref 70–100)
HCT VFR BLD AUTO: 38.1 % (ref 36–47)
HGB BLD-MCNC: 12.2 G/DL (ref 12–15.5)
LYMPHOCYTES # BLD AUTO: 0.9 10^3/UL (ref 1.5–5)
LYMPHOCYTES NFR BLD AUTO: 17 % (ref 24–44)
MCH RBC QN AUTO: 29.2 PG (ref 27–33)
MCHC RBC AUTO-ENTMCNC: 32 G/DL (ref 32–36.5)
MCV RBC AUTO: 91.1 FL (ref 80–96)
MONOCYTES # BLD AUTO: 0.6 10^3/UL (ref 0–0.8)
MONOCYTES NFR BLD AUTO: 10.6 % (ref 0–5)
NEUTROPHILS # BLD AUTO: 3.7 10^3/UL (ref 1.5–8.5)
NEUTROPHILS NFR BLD AUTO: 69.9 % (ref 36–66)
PLATELET # BLD AUTO: 150 10^3/UL (ref 150–450)
POTASSIUM SERPL-SCNC: 4.3 MEQ/L (ref 3.5–5.1)
PROT SERPL-MCNC: 6.6 GM/DL (ref 6.4–8.2)
RBC # BLD AUTO: 4.18 10^6/UL (ref 4–5.4)
SODIUM SERPL-SCNC: 139 MEQ/L (ref 136–145)
WBC # BLD AUTO: 5.3 10^3/UL (ref 4–10)

## 2019-12-30 ENCOUNTER — HOSPITAL ENCOUNTER (OUTPATIENT)
Dept: HOSPITAL 53 - M LAB REF | Age: 54
End: 2019-12-30
Attending: DERMATOLOGY
Payer: COMMERCIAL

## 2019-12-30 DIAGNOSIS — C44.529: Primary | ICD-10-CM

## 2020-01-29 ENCOUNTER — HOSPITAL ENCOUNTER (OUTPATIENT)
Dept: HOSPITAL 53 - M RAD | Age: 55
End: 2020-01-29
Attending: FAMILY MEDICINE
Payer: COMMERCIAL

## 2020-01-29 DIAGNOSIS — C25.3: ICD-10-CM

## 2020-01-29 DIAGNOSIS — R91.8: Primary | ICD-10-CM

## 2020-01-29 DIAGNOSIS — R93.7: ICD-10-CM

## 2020-01-29 PROCEDURE — 72157 MRI CHEST SPINE W/O & W/DYE: CPT

## 2020-01-29 NOTE — REPVR
PROCEDURE INFORMATION: 

Exam: MR Thoracic Spine Without and With Contrast 

Exam date and time: 1/29/2020 6:45 PM 

Age: 54 years old 

Clinical indication: Condition or disease and abnormal findings; Abnormal 

radiologic findings of thoracic and bone lesion, thoracic vertebrae; Cancer, 

metastatic/secondary to thoracic bone; Patient HX: HX pancreatic CA, abn lesion 

found on CT on pacs; Additional info: Pain in t spine, pancreatic CA 



TECHNIQUE: 

Imaging protocol: Multiplanar magnetic resonance images of the thoracic spine 

without and with intravenous contrast. 

Contrast material: PROHANCE; Contrast volume: 12 ml; Contrast route: 22G ANGIP; 

 



COMPARISON: 

CR Spine, Thoracic 3 VIEWS 1/14/2020 9:38 AM 



FINDINGS: 

Vertebrae: Unremarkable. 

Marrow: Abnormal marrow signal at T12 extending into the left pedicle and 

posterior right side of T6 extending into the right pedicle and posterior 

elements which is T1 dark, stir bright, heterogeneously enhancing post-contrast 

consistent with metastatic disease. 

Spinal cord: Normal signal. No cord compression. 

Discs/Spinal canal/Neural foramina:  No significant disc disease. No 

significant spinal canal stenosis. 



Lungs: Multiple pulmonary parenchymal nodules including a large mass at the 

left lung base measuring approximately 6 cm consistent with metastatic disease. 

Soft tissues: Unremarkable. 



IMPRESSION: 

1. Abnormal marrow signal at T12 extending into the left pedicle and posterior 

right side of T6 extending into the right pedicle and posterior elements which 

is T1 dark, stir bright, heterogeneously enhancing post-contrast consistent 

with metastatic disease. 

2. Multiple pulmonary parenchymal nodules including a large mass at the left 

lung base measuring approximately 6 cm consistent with metastatic disease. 



Electronically signed by: Bret Stanford On 01/29/2020  20:17:11 PM

## 2020-01-30 ENCOUNTER — HOSPITAL ENCOUNTER (OUTPATIENT)
Dept: HOSPITAL 53 - M ONCR | Age: 55
End: 2020-01-30
Attending: RADIOLOGY
Payer: COMMERCIAL

## 2020-01-30 DIAGNOSIS — C24.1: Primary | ICD-10-CM

## 2020-01-31 ENCOUNTER — HOSPITAL ENCOUNTER (INPATIENT)
Dept: HOSPITAL 53 - M MSPAV | Age: 55
LOS: 1 days | Discharge: HOME | DRG: 861 | End: 2020-02-01
Attending: INTERNAL MEDICINE | Admitting: INTERNAL MEDICINE
Payer: COMMERCIAL

## 2020-01-31 ENCOUNTER — HOSPITAL ENCOUNTER (OUTPATIENT)
Dept: HOSPITAL 53 - M RAD | Age: 55
End: 2020-01-31
Attending: INTERNAL MEDICINE
Payer: COMMERCIAL

## 2020-01-31 VITALS — WEIGHT: 127.87 LBS | BODY MASS INDEX: 20.55 KG/M2 | HEIGHT: 66 IN

## 2020-01-31 VITALS — SYSTOLIC BLOOD PRESSURE: 146 MMHG | DIASTOLIC BLOOD PRESSURE: 77 MMHG

## 2020-01-31 VITALS — DIASTOLIC BLOOD PRESSURE: 72 MMHG | SYSTOLIC BLOOD PRESSURE: 142 MMHG

## 2020-01-31 DIAGNOSIS — Z88.6: ICD-10-CM

## 2020-01-31 DIAGNOSIS — K27.9: ICD-10-CM

## 2020-01-31 DIAGNOSIS — K86.89: ICD-10-CM

## 2020-01-31 DIAGNOSIS — G89.3: Primary | ICD-10-CM

## 2020-01-31 DIAGNOSIS — C25.9: ICD-10-CM

## 2020-01-31 DIAGNOSIS — M54.9: ICD-10-CM

## 2020-01-31 DIAGNOSIS — C79.51: ICD-10-CM

## 2020-01-31 DIAGNOSIS — Z92.21: ICD-10-CM

## 2020-01-31 DIAGNOSIS — Z92.3: ICD-10-CM

## 2020-01-31 DIAGNOSIS — C25.9: Primary | ICD-10-CM

## 2020-01-31 DIAGNOSIS — Z79.899: ICD-10-CM

## 2020-01-31 DIAGNOSIS — C78.00: ICD-10-CM

## 2020-01-31 DIAGNOSIS — Z90.49: ICD-10-CM

## 2020-01-31 LAB
ALBUMIN SERPL BCG-MCNC: 3.7 GM/DL (ref 3.2–5.2)
ALT SERPL W P-5'-P-CCNC: 358 U/L (ref 12–78)
AMYLASE SERPL-CCNC: 33 U/L (ref 25–115)
BILIRUB SERPL-MCNC: 5.9 MG/DL (ref 0.2–1)
BUN SERPL-MCNC: 12 MG/DL (ref 7–18)
CALCIUM SERPL-MCNC: 9.2 MG/DL (ref 8.5–10.1)
CANCER AG19-9 SERPL-ACNC: 539.5 U/ML (ref ?–35)
CHLORIDE SERPL-SCNC: 98 MEQ/L (ref 98–107)
CO2 SERPL-SCNC: 25 MEQ/L (ref 21–32)
CREAT SERPL-MCNC: 0.86 MG/DL (ref 0.55–1.3)
GFR SERPL CREATININE-BSD FRML MDRD: > 60 ML/MIN/{1.73_M2} (ref 51–?)
GLUCOSE SERPL-MCNC: 105 MG/DL (ref 70–100)
HCT VFR BLD AUTO: 40.7 % (ref 36–47)
HGB BLD-MCNC: 13.5 G/DL (ref 12–15.5)
LIPASE SERPL-CCNC: 27 U/L (ref 73–393)
MAGNESIUM SERPL-MCNC: 2 MG/DL (ref 1.8–2.4)
MCH RBC QN AUTO: 28.4 PG (ref 27–33)
MCHC RBC AUTO-ENTMCNC: 33.2 G/DL (ref 32–36.5)
MCV RBC AUTO: 85.7 FL (ref 80–96)
PLATELET # BLD AUTO: 220 10^3/UL (ref 150–450)
POTASSIUM SERPL-SCNC: 3.8 MEQ/L (ref 3.5–5.1)
PROT SERPL-MCNC: 7.1 GM/DL (ref 6.4–8.2)
RBC # BLD AUTO: 4.75 10^6/UL (ref 4–5.4)
SODIUM SERPL-SCNC: 134 MEQ/L (ref 136–145)
WBC # BLD AUTO: 14.3 10^3/UL (ref 4–10)

## 2020-01-31 PROCEDURE — 83735 ASSAY OF MAGNESIUM: CPT

## 2020-01-31 PROCEDURE — 85027 COMPLETE CBC AUTOMATED: CPT

## 2020-01-31 PROCEDURE — 82150 ASSAY OF AMYLASE: CPT

## 2020-01-31 PROCEDURE — 83690 ASSAY OF LIPASE: CPT

## 2020-01-31 PROCEDURE — 86301 IMMUNOASSAY TUMOR CA 19-9: CPT

## 2020-01-31 PROCEDURE — 74177 CT ABD & PELVIS W/CONTRAST: CPT

## 2020-01-31 PROCEDURE — 71260 CT THORAX DX C+: CPT

## 2020-01-31 PROCEDURE — 80053 COMPREHEN METABOLIC PANEL: CPT

## 2020-01-31 RX ADMIN — MORPHINE SULFATE PRN MG: 2 INJECTION, SOLUTION INTRAMUSCULAR; INTRAVENOUS at 18:12

## 2020-01-31 RX ADMIN — PANCRELIPASE SCH EA: 24000; 76000; 120000 CAPSULE, DELAYED RELEASE PELLETS ORAL at 22:50

## 2020-01-31 RX ADMIN — PANTOPRAZOLE SODIUM SCH MG: 40 TABLET, DELAYED RELEASE ORAL at 22:49

## 2020-01-31 RX ADMIN — SODIUM CHLORIDE, SODIUM LACTATE, POTASSIUM CHLORIDE, CALCIUM CHLORIDE AND DEXTROSE MONOHYDRATE SCH MLS/HR: 5; 600; 310; 30; 20 INJECTION, SOLUTION INTRAVENOUS at 18:11

## 2020-01-31 RX ADMIN — MORPHINE SULFATE PRN MG: 2 INJECTION, SOLUTION INTRAMUSCULAR; INTRAVENOUS at 21:55

## 2020-01-31 NOTE — REP
CT CHEST WITH IV CONTRAST:

 

TECHNIQUE:  Axial contrast enhanced images from the thoracic inlet to the upper

abdomen using 100 mL Isovue 370 intravenous contrast material with multiplanar

reformations.

 

COMPARISON: 02/28/2019

 

The lungs show multiple innumerable nodules bilaterally diffusely. When compared

to the prior study of 02/28/2019 they have increased in size and number. Most are

spiculated. The largest is in the left lower lobe medially with an extensive

contiguity with the adjacent posteromedial left pleural surface. This measures

approximately 6.5 x 4.8 cm. There is no axillary adenopathy. No mediastinal

adenopathy is seen. Heart is normal in size. Thoracic aorta is normal in caliber.

There is no pleural or pericardial effusion. There is a heterogeneous bone lesion

in the T12 vertebral body as seen on the recent MRI 01/29/2020 consistent with a

metastatic lesion. No other osseous lesion is seen of the visualized osseous

structures.

 

IMPRESSION:

 

Multiple innumerable diffuse pulmonary nodules as discussed above consistent with

metastatic disease. These have increased in size and number when compared to the

prior study 02/28/2019. The largest is in the left lower lobe medially with

extensive contiguity of the medial pleural surface in this region.

 

Bone lesion of T12 vertebral body noted, as seen on the recent MRI of the

thoracic spine 01/29/2020.

 

 

Electronically Signed by

Noam Sabillon MD 02/05/2020 05:00 P

## 2020-01-31 NOTE — REPVR
PROCEDURE INFORMATION: 

Exam: MRCP Abdomen Without Contrast 

Exam date and time: 1/31/2020 9:48 PM 

Age: 54 years old 

Clinical indication: Condition or disease; Cancer; Pancreas; Abdominal 

tenderness; Patient HX: Elevated lfts, R/O biliary obstruction, HX or 

pancreatic CA 



TECHNIQUE: 

Imaging protocol: MRCP of the abdomen without contrast. 

3D rendering: MIP and/or 3D reconstructed images were created by the 

technologist. 



COMPARISON: 

CT ABD/PEL W/IV ORAL CONTRAS 1/31/2020 4:22 PM 



FINDINGS: 

Liver: No mass. 

Gallbladder and bile ducts: There has been a cholecystectomy. Pneumobilia 

likely related to prior surgical bypass. Irregular contour of the common bile 

duct with narrowing at the origin of the right and left pancreatic ducts may 

represent strictures either related to pancreatic carcinoma or prior 

inflammatory disease, history of pancreatitis, primary sclerosing cholangitis 

and least likely cholangiocarcinoma. 

Pancreas: Diffuse pancreatic atrophy. Dilated pancreatic duct in the body and 

tail measures up to 5.3 mm. Low signal focus measuring 6 mm in the proximal 

common bile duct appears to represent a source of obstruction likely 

representing a calculus. Spiculated contour of the pancreatic head and neck 

region measuring 1.6 x 2.4 cm may correspond to the known pancreatic carcinoma 

versus postoperative scarring. 

Spleen: Small cyst in the spleen measures 5 mm. Spleen otherwise unremarkable. 

Adrenals: Unremarkable. No mass. 

Kidneys and ureters: Unremarkable. No solid mass. No hydronephrosis. 

Stomach and bowel: Unremarkable. 

Intraperitoneal space: No fluid collection. 

Arteries: No abdominal aortic aneurysm. 

Bones/joints: Abnormal marrow space signal at the left-sided T11, as well as 

T9-T10 (seen on the  view only) likely metastatic. 

Soft tissues: Unremarkable. 



IMPRESSION: 

1. There has been a cholecystectomy. 

2. Diffuse pancreatic atrophy. Dilated pancreatic duct in the body and tail. 

Low signal focus measuring 6 mm in the proximal common bile duct appears to 

represent a source of obstruction likely representing a calculus. Spiculated 

contour of the pancreatic head and neck region. May correspond to the known 

pancreatic carcinoma versus postoperative scarring. 

3. Pneumobilia likely related to prior surgical bypass. Irregular contour of 

the common bile duct with narrowing at the origin of the right and left 

pancreatic ducts may represent strictures either related to pancreatic 

carcinoma or prior inflammatory disease, history of pancreatitis, primary 

sclerosing cholangitis and least likely cholangiocarcinoma. 

4. Probable metastatic lesions in the T9, T10 and T11 vertebral bodies.



Electronically signed by: Bret Stanford On 01/31/2020  22:27:26 PM

## 2020-01-31 NOTE — REP
CT ABDOMEN AND PELVIS WITH ORAL AND IV CONTRAST:

 

TECHNIQUE:  Axial contrast enhanced images from the lung bases to the pubic

symphysis using 100 mL Isovue 370 intravenous contrast material with multiplanar

reformations.

 

 

 

COMPARISON: Prior study 02/28/2019.

 

Liver and spleen are again essentially normal in appearance with no new lesion.

There is pneumobilia again seen, as on prior study, with postsurgical

configuration of stomach and proximal small bowel in the upper abdomen. Scattered

metallic clips and sutures are seen in this region. Adrenal glands are normal.

No pancreatic mass is seen. There is stable pancreatic ductal dilatation.

Calcification in the pancreas anterior to the proximal portal vein is again

noted, as on prior study. It is about 12 mm maximally, unchanged. The kidneys are

normal. There is no hydronephrosis. Subcentimeter periaortic lymph nodes are

nonspecific with no significant adenopathy seen in the abdomen or pelvis. There

is no free air or free fluid. There is no abdominal aortic aneurysm. No bowel

wall thickening is seen. The urinary bladder appears unremarkable. No pelvic mass

is seen. The uterus is deviated to the right. There is no ovarian mass

identified. There are degenerative changes of the spine. Bone lesion of T12 is

noted, as seen on prior MRI of 01/29/2020. No other bone lesion is seen of the

visualized osseous structures of the abdomen and pelvis.

 

IMPRESSION:

 

 No mass or adenopathy seen in the abdomen or pelvis. No acute findings.

 

 

Electronically Signed by

Noam Sabillon MD 02/05/2020 05:01 P

## 2020-02-01 VITALS — DIASTOLIC BLOOD PRESSURE: 62 MMHG | SYSTOLIC BLOOD PRESSURE: 120 MMHG

## 2020-02-01 VITALS — DIASTOLIC BLOOD PRESSURE: 62 MMHG | SYSTOLIC BLOOD PRESSURE: 110 MMHG

## 2020-02-01 LAB
ALBUMIN SERPL BCG-MCNC: 2.8 GM/DL (ref 3.2–5.2)
ALT SERPL W P-5'-P-CCNC: 233 U/L (ref 12–78)
BILIRUB SERPL-MCNC: 4.3 MG/DL (ref 0.2–1)
BUN SERPL-MCNC: 7 MG/DL (ref 7–18)
CALCIUM SERPL-MCNC: 8.8 MG/DL (ref 8.5–10.1)
CHLORIDE SERPL-SCNC: 104 MEQ/L (ref 98–107)
CO2 SERPL-SCNC: 27 MEQ/L (ref 21–32)
CREAT SERPL-MCNC: 0.62 MG/DL (ref 0.55–1.3)
GFR SERPL CREATININE-BSD FRML MDRD: > 60 ML/MIN/{1.73_M2} (ref 51–?)
GLUCOSE SERPL-MCNC: 134 MG/DL (ref 70–100)
HCT VFR BLD AUTO: 36 % (ref 36–47)
HGB BLD-MCNC: 11.4 G/DL (ref 12–15.5)
INR PPP: 1.04
MCH RBC QN AUTO: 27.9 PG (ref 27–33)
MCHC RBC AUTO-ENTMCNC: 31.7 G/DL (ref 32–36.5)
MCV RBC AUTO: 88.2 FL (ref 80–96)
PLATELET # BLD AUTO: 173 10^3/UL (ref 150–450)
POTASSIUM SERPL-SCNC: 3.7 MEQ/L (ref 3.5–5.1)
PROT SERPL-MCNC: 6.4 GM/DL (ref 6.4–8.2)
PROTHROMBIN TIME: 13.3 SECONDS (ref 11.8–14)
RBC # BLD AUTO: 4.08 10^6/UL (ref 4–5.4)
SODIUM SERPL-SCNC: 136 MEQ/L (ref 136–145)
WBC # BLD AUTO: 6.4 10^3/UL (ref 4–10)

## 2020-02-01 RX ADMIN — PANTOPRAZOLE SODIUM SCH MG: 40 TABLET, DELAYED RELEASE ORAL at 08:29

## 2020-02-01 RX ADMIN — PANCRELIPASE SCH EA: 24000; 76000; 120000 CAPSULE, DELAYED RELEASE PELLETS ORAL at 08:29

## 2020-02-01 RX ADMIN — SODIUM CHLORIDE, SODIUM LACTATE, POTASSIUM CHLORIDE, CALCIUM CHLORIDE AND DEXTROSE MONOHYDRATE SCH MLS/HR: 5; 600; 310; 30; 20 INJECTION, SOLUTION INTRAVENOUS at 05:00

## 2020-02-01 RX ADMIN — MORPHINE SULFATE PRN MG: 2 INJECTION, SOLUTION INTRAMUSCULAR; INTRAVENOUS at 01:30

## 2020-02-01 NOTE — IPNPDOC
Subjective


Date Seen


The patient was seen on 2/1/20.





Subjective


Chief Complaint/HPI


Florence is not having any abd pain nor n/v this morning. She's afebrile.  C/o back 

pain.





Objective


Physical Examination


Eye Exam:  Positive: Sclera icteric


ENT Exam:  Positive: Mucous membr. moist/pink


Neck Exam:  Positive: Supple


Chest Exam:  Positive: Normal air movement


Heart Exam:  Positive: Rate Normal, Normal S1, Normal S2


Abdomen Exam:  Positive: Normal bowel sounds, Soft; 


   Negative: Tenderness, Hepatospenomegaly


Extremity Exam:  Negative: Clubbing, Cyanosis, Edema


Neuro Exam:  Positive: Normal Speech


Psych Exam:  Positive: Mental status NL, Mood NL





Assessment /Plan


Assessment


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


- oxycodone prn


- patient to be discharged today and travel to [facility] for further evaluation, as previously instructed to do so by her primary 

oncologist





# Abd pain with nausea vomiting


# Possible Biliary obstruction


# Pancreatic duct stricture


- abd pain is resolved, tolerated diet this morning


- LFTs have trended lower


- MRCP report reviewed ( ? 6 mm CBD stone, pancreatic stricture)


- received IV rocephin x 1 dose last night 


- will need ERCP to investigate rise in LFTs, unable to do at [facility] due to hx of 

whipple procedure





# PUD


- continue protonix 





Dispo: Discharge, patient and  to drive to [facility] for evaluation at [facility]





Plan/VTE


VTE Prophylaxis Ordered?:  Yes


VTE Exclusion Mechanical Proph:  N/A:VTE Prophy Ordered


VTE Exclusion Pharmacological:  N/A:VTE Prophy Ordered





VS, I&O, 24H, Fishbone


Vital Signs/I&O





Vital Signs








  Date Time  Temp Pulse Resp B/P (MAP) Pulse Ox O2 Delivery O2 Flow Rate FiO2


 


2/1/20 06:00 99.5 73 19 110/62 (78) 97 Room Air  














I&O- Last 24 Hours up to 6 AM 


 


 2/1/20





 05:59


 


Intake Total 650 ml


 


Output Total 500 ml


 


Balance 150 ml











Laboratory Data


24H LABS


Laboratory Tests 2


2/1/20 06:06: 


Nucleated Red Blood Cells % (auto) 0.0, Prothrombin Time 13.3, Prothromb Time 

International Ratio 1.04, Anion Gap 5L, Glomerular Filtration Rate > 60.0, 

Calcium Level 8.8, Total Bilirubin 4.3H, Aspartate Amino Transf (AST/SGOT) 74H, 

Alanine Aminotransferase (ALT/SGPT) 233H, Alkaline Phosphatase 861H, Total 

Protein 6.4, Albumin 2.8#L, Albumin/Globulin Ratio 0.78L


CBC/BMP


Laboratory Tests


2/1/20 06:06

















KRIS HUBER MD              Feb 1, 2020 10:10

## 2020-02-01 NOTE — HPE
DATE OF ADMISSION:  01/31/2020

 

At approximately 5:30 p.m.

 

CHIEF COMPLAINT: Intractable back pain secondary to new metastatic disease.

 

HISTORY OF PRESENT ILLNESS: Florence is a 54-year-old woman who is employed at the

[employer]. She has a history of ampullary pancreatic cancer dating back

to 2009, for which she underwent a Whipple and subsequently underwent

chemotherapy and radiation. She had subsequent relapse of her disease and was

found to have a lung metastasis in 2015 and had been on several regimens of

chemotherapy through her oncologist at [facility] in [facility]. She has not had any chemotherapy for approximately a year, as she

wanted a break. She also has a history of peptic ulcer disease with perforated

viscus in the past secondary to chronic nonsteroidal anti-inflammatory drugs

(NSAIDs) use. She also has associated pancreatic insufficiency. The patient had

started experiencing abdominal pain with associated nausea, vomiting, and back

pain over the past several days. She initially though it was a recurrence of

pancreatitis. She had sought attention at her local oncologist's office, and

imaging was done today of the thoracic spine, and she was found to have new

metastatic disease to her thoracic spine. In addition, she was found to have

elevated liver function tests consisting of significantly elevated alkaline

phosphatase, likely representative of her metastatic disease as well as AST and

ALT. The remainder of her labs done as an outpatient are unremarkable. White

count 14.3, hemoglobin 13.5, hematocrit 40.7, platelet count of 220. Sodium 134,

potassium 3.8, chloride 98, bicarbonate 25, anion gap 11, BUN 12, creatinine

0.86, glucose 105, calcium 9.2, magnesium 2. Total bilirubin is 5.9, AST is 143,

ALT is 358, alkaline phosphatase is 951.

 

ALLERGIES: DILAUDID, which is more of an adverse reaction that causes her to feel

quite weak.

 

HOME MEDICATIONS:

- dexamethasone 4 mg taken following chemotherapy

- Lactobacillus

- Creon

- Protonix 40 mg twice a day

- Compazine

- Tylenol as needed

 

PAST MEDICAL HISTORY:

Notable for:

1. 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

thoracic spine.

2. Peptic ulcer disease.

3. Pancreatic insufficiency.

 

PAST SURGICAL HISTORY:

Notable for:

1. Whipple.

2. Exploratory laparotomy.

 

SOCIAL HISTORY: Patient does not smoke. Does not use drugs or tobacco. She does

not have a medical order for life-sustaining treatment (MOLST) form but lists her

 as the surrogate medical decision maker. She will be a full code.

 

FAMILY HISTORY: Asked but is noncontributory.

 

REVIEW OF SYSTEMS: Positive for nausea, vomiting. No hematochezia. No

hematemesis. No melena. No present abdominal pain at this time. Remainder of

review of systems reviewed with the patient is negative.

 

PHYSICAL EXAMINATION: Patient's temperature is 98.7, pulse 88, respirations 19,

blood pressure 142/72, oxygen saturation 97% on room air. In general, the patient

is alert and oriented times three. She is in moderate distress associated with

her acute midthoracic pain.  Her range of motion is limited secondary to the

pain. Head normocephalic, atraumatic. Her pupils are without any chemosis. She

does have scleral icterus. Oropharynx: Mucosa appears moist without any visible

lesions. Her neck is supple. No palpable lymphadenopathy in the cervical or

supraclavicular distributions. Trache is midline. Lung sounds are appreciated

bilaterally without rales, wheezing, or rhonchi. Heart: S1, S2. No audible

murmurs or gallops. Her abdomen is soft, scaphoid in  appearance, nontender,

nondistended. She has active bowel sounds. Extremities are without any cyanosis,

clubbing, or edema. Dorsalis pedis pulses are 2+ and symmetric. Neurologic:

Cranial nerves II-XII grossly intact. Patient has no lower extremity weakness.

Exam is limited secondary to her pain.

 

Outpatient labs have been reviewed above.

 

IMAGING STUDY done today consisted of a thoracic spine MRI. The findings are

abnormal marrow signal at T12, extending into the left pedicle and posterior

right side of T6 extending into the right pedicle and  posterior elements, which

is T1 dark, STIR bright ______________ enhancing post contrast consistent with

metastatic disease. Spinal cord shows normal signal. No cord compression. No

significant disc disease. No significant spinal canal stenosis. Multiple

pulmonary parenchymal nodules, including a large mass at the left lung base,

measuring approximately 6 cm, consistent with metastatic disease.

 

IMPRESSION:

1. Acute malignant back pain secondary to known metastatic pancreatic carcinoma.

 

2. Elevated transaminase levels.

3. History of peptic ulcer disease.

 

PLAN: Patient will be admitted as a direct admit to the general medical floor.

She will be treated with intravenous (IV) morphine 2 mg every 2 hours as needed,

oxycodone 10 mg every 6 hours. We will place a Lidoderm patch. She will receive

lactated Ringer's (LR) with dextrose at about 100 mL an hour. We will resume her

diet as tolerated. We will obtain an ultrasound of her right upper quadrant to

delineate whether there is any underlying biliary disease consistent with

obstruction based on her elevated pattern of labs. Patient will be continued on

the majority of her home medications with the exception of her dexamethasone for

now. She will be a full code. She will be placed on Lovenox and thromboembolic

deterrents (TEDs)  for deep vein thrombosis (DVT) prophylaxis.
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
## 2020-02-02 NOTE — DSES
DATE OF ADMISSION:  01/31/2020

DATE OF DISCHARGE:  02/01/2020

 

DISCHARGE DIAGNOSES:

1. Acute back pain secondary to a new metastatic pancreatic cancer to thoracic

vertebrae.

2. Abdominal pain with nausea and vomiting, possibly secondary to pancreatic duct

stricture versus possible common bile duct obstruction.

3. Peptic ulcer disease.

4. Metastatic pancreatic cancer to the lungs.

 

PROCEDURES PERFORMED DURING HOSPITALIZATION: None.

 

CONSULTANTS ON THE CASE: None.

 

DISPOSITION: Patient is discharged home so that she can travel to [facility] in [facility] so she can be admitted at that hospital and evaluated

by her primary oncologist for further evaluation of her elevated bilirubin

level.

 

CONDITION AT DISCHARGE: Stable.

 

DISCHARGE MEDICATIONS: Conjugated estrogen, Flexeril, gabapentin, Lactobacillus,

pancreatic enzymes, and Protonix.

 

RELEVANT LABORATORY DATA:

White count 6.4, hemoglobin 11.4, hematocrit 36, platelet count 173. INR is 1.04,

PT is 13.3.

 

Sodium is 136, potassium 3.7, chloride 104, bicarbonate 27, BUN 7, creatinine

0.62, glucose 134, calcium 8.8. Total bilirubin is down today to 4.3 from 5.9

yesterday. AST is 74, which is improved from AST of 142 yesterday. ALT is 233,

which is also improved this morning. Alkaline phosphatase is also down at 861.

Alkaline phosphatase isoenzyme is pending. Total protein 6.4, albumin is 2.8.

 

RELEVANT IMAGING STUDIES:

MRCP: Please reference report for details, but impression: There has been a

cholecystectomy with diffuse pancreatic atrophy, dilated pancreatic duct in the

body tail. Low signal focus measuring 6 mm in the proximal common duct. Appears

to represent a source of obstruction, likely representing a calculus. Spiculated

contour of the pancreatic head and neck region. May correspond to the known

pancreatic carcinoma verus postoperative scarring. Pneumophila, likely related to

prior surgical bypass. Irregular contour of the common bile duct with narrowing

at the origin in the right and left pancreatic ducts. May represent strictures,

either related to pancreatic carcinoma or prior inflammatory disease. History of

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

Four probable metastatic lesions at T9, T10, and T11 vertebral bodies.

 

HOSPITAL COURSE: Florence Cotter is a 54-year-old woman who has history of metastatic

pancreatic cancer, who had presented to the hospital after developing nausea,

vomiting, and abdominal pain with associated acute back pain. She was seen in the

outpatient setting by a local oncologist, who ordered an MRI of her thoracic

spine. This indicated that she had several new metastatic lesions to the thoracic

vertebrae as outlined above. Because her pain was uncontrolled, she was admitted

to the hospital. Outpatient labs also indicated that she had an elevation in her

bilirubin of 5.9 and elevated alkaline phosphatase at 951 with corresponding

increases in ALT and AST levels also. On admission patient had no palpable

abdominal pain. She was only complaining of back pain. She was treated with

intravenous (IV) morphine as well as oxycodone due to her elevated transaminase

levels. Clinically, she had some relief of her pain. She was able to take a diet

this morning without any intractable nausea and vomiting. Because her numbers

were improving, I felt that she could be safely discharged and [place]

to see her primary oncologist. Her primary oncologist was concerned and had

contacted her and recommended that she travel down there to be admitted and

evaluated. We were unable to do an endoscopic retrograde cholangiopancreatography

(ERCP) here secondary to previous history of Whipple procedure in the past. At

the time of discharge, patient is afebrile. Vital signs are stable. She was

discharged in stable condition. During this hospitalization I did give her a dose

of IV Rocephin, which she received at approximately 8 p.m. on 01/31/2020.

 

A total of 30 minutes was spent completing all discharge paperwork.

## 2020-02-06 NOTE — RADONC
RADIATION ONCOLOGY SIMULATION NOTE

 

DATE: 02/06/2020

 

CHART NUMBER: 

 

Florence was taken to the CT scan for CT simulation of her spinal field.  CT was

accomplished without difficulty or discomfort.  Radiation treatment planning is

underway and radiation treatments will begin subsequently.

 

An immobilization device was created and will be used throughout the course of

treatment.  It was created without difficulty or discomfort.

 

I was physically present throughout the course of CT simulation.

## 2020-02-18 NOTE — RADONC
RADIATION ONCOLOGY

 

DATE:  02/17/2020

 

CHART NUMBER:  

 

Ms Cotter has the diagnosis of initially pancreatic CA many years ago underwent 
whipples about 10 years ago.   

She developed lung metastasis she has been on and off chemotherapy  and recently
developed  spinal metastasis.  

She is receiving palliative radiation therapies to T spine.  So far, she has 
received a dose of

1500 cGy in 300 cGy daily fractions.  She said the pain intensity has not

changed.

 

REVIEW OF SYSTEMS: She denies nausea, vomiting night sweats, headache anxiety,

depression or weight loss.  The pain intensity is on a scale of 8-9.

 

PHYSICAL EXAMINATION:

She weighs 129/8 pounds, blood pressure 125/78, pulse 96, respiration 16,

temperature 97.8, oxygen saturation is 98% in room air.

There are no palpable lymphadenopathies.   There is no noticeable skin changes

over the spine.  There is no weakness of the extremities.

 

ASSESSMENT/PLAN:

Ms. Cotter with a diagnosis of longstanding pancreatic CA with metastasis.  She 
has

been on and  off chemotherapy since diagnosis.  Now she

has a new metastasis in the T spine.  So far she is tolerating treatments well,

however, the pain intensity remains to be the same.  Radiation therapy will

continue as planned.

[facility]

## 2020-02-24 ENCOUNTER — HOSPITAL ENCOUNTER (OUTPATIENT)
Dept: HOSPITAL 53 - M ONCR | Age: 55
LOS: 5 days | End: 2020-02-29
Attending: RADIOLOGY
Payer: COMMERCIAL

## 2020-02-24 DIAGNOSIS — C79.51: Primary | ICD-10-CM

## 2020-02-25 NOTE — RADONC
RADIATION ONCOLOGY TREATMENT SUMMARY

 

DATE:  02/24/2020

 

CHART #:  

 

DIAGNOSIS:  Adenocarcinoma of the ampulla.

 

STAGE:  Metastatic.

 

ECOG PERFORMANCE STATUS:  0.

 

TREATMENT SUMMARY:

Ms. Cotter is a delightful 54-year-old white female with the diagnosis of

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

us for consideration of palliative radiation therapy to the T6 vertebral body.

 

We treated the patient to her spine from the levels of T5 through T7 for a dose

of 3000 cGy delivered in 10 fractions of 300 cGy each over 14 elapsed days from

02/10/2020 through 02/24/2020.  The patient's spine was treated on the linear

accelerator utilizing a 6 MV photon beam via single posterior field prescribed to

a depth of 6 cm.

 

Ms. Cotter tolerated her treatments quite well with no difficulties related to her

radiation therapy.

 

The patient is scheduled see me again in 1 month for further followup.  She will

also continue to be followed by her other physicians as well.

 

 

 

 

 

cc:    MD Tan Quiñones MD Karen Williams, MD

## 2020-03-12 ENCOUNTER — HOSPITAL ENCOUNTER (OUTPATIENT)
Dept: HOSPITAL 53 - M RAD | Age: 55
End: 2020-03-12
Attending: INTERNAL MEDICINE
Payer: COMMERCIAL

## 2020-03-12 DIAGNOSIS — J90: ICD-10-CM

## 2020-03-12 DIAGNOSIS — C24.1: ICD-10-CM

## 2020-03-12 DIAGNOSIS — C78.00: Primary | ICD-10-CM

## 2020-03-12 DIAGNOSIS — Z95.9: ICD-10-CM

## 2020-03-12 PROCEDURE — 74177 CT ABD & PELVIS W/CONTRAST: CPT

## 2020-03-12 PROCEDURE — 71260 CT THORAX DX C+: CPT

## 2020-03-13 NOTE — REP
Clinical:  Metastatic pancreatic carcinoma.

 

Technique:  Axial contrast enhanced images from the lung bases to the pubic

symphysis using oral (per protocol) and 100 ml Isovue 370 intravenous contrast

material with delayed images of the abdomen as well as coronal and sagittal

re-formations.

 

Comparison:  01/31/2020.

 

Findings:

Liver, spleen, bilateral adrenal glands and kidneys appear normal.  Pancreatic

ductal dilatation and ductal calcifications remain stable.  Postsurgical changes

in the upper abdomen with mild stable pneumobilia again noted.  No evidence for

bowel obstruction or acute inflammatory process.  Pelvis demonstrates normal

bladder and age-appropriate uterus/adnexa.  Abdominal aorta and vasculature

appear normal.  No ascites.  No obvious adenopathy or abdominopelvic mass lesion.

Musculoskeletal structures are intact without focal osseous abnormality.

 

Impression:

1.  No evidence for metastatic disease involving the abdomen/pelvis.

Specifically, no ascites, adenopathy, inflammatory stranding, or mass lesion

appreciated.

2.  Stable postsurgical changes in the upper abdomen.

3.  Stable appearance to the pancreas.

4.  Obvious pulmonary metastatic disease.

 

 

Electronically Signed by

Tani Hayden MD 03/13/2020 06:14 A

## 2020-03-13 NOTE — REP
Clinical:  Metastatic pancreatic carcinoma.

 

Technique:  Axial contrast enhanced images from the thoracic inlet to the upper

abdomen with coronal and sagittal re-formations using 100 ml Isovue 370

intravenous contrast material.

 

Comparison:  01/31/2020.

 

Findings:

Scattered innumerable metastatic lesions are noted throughout the bilateral lung

fields which have increased in quantity and size.  Specifically, right upper lobe

lesion at the level of the lulu measures 2.6 cm AP diameter and previously

measured 2.3 a centimeters AP diameter at the same level while a left upper lobe

lesion measures 2.4 x 2.2 cm maximal diameter and previously measured 2.0 x 1.9

cm maximal diameter at the same level.  Largest mass involving the medial left

lower lobe currently measures 5.6 x 5.4 cm centimeters maximal diameter and

previously measured 5.1 x 4.7 cm maximal diameter.

 

New small left pleural effusion is now identified.  No pneumothorax.

Tracheobronchial tree is grossly patent.  Right hilar lymph node measures 1.3 cm

diameter.  No further significant adenopathy noted.  Thoracic aorta, pulmonary

vasculature and heart/pericardium appear normal.  Bblyqi-[id number] identified with

tip in the SVC.  Musculoskeletal structures are intact without focal

abnormality.

 

Impression:

Findings suggest increasing metastatic load including new small left pleural

effusion.

 

 

Electronically Signed by

Tani Hayden MD 03/13/2020 06:02 A

## 2020-03-24 ENCOUNTER — HOSPITAL ENCOUNTER (OUTPATIENT)
Dept: HOSPITAL 53 - M RAD | Age: 55
End: 2020-03-24
Attending: INTERNAL MEDICINE
Payer: COMMERCIAL

## 2020-03-24 ENCOUNTER — HOSPITAL ENCOUNTER (OUTPATIENT)
Dept: HOSPITAL 53 - M ONCR | Age: 55
End: 2020-03-24
Attending: RADIOLOGY
Payer: COMMERCIAL

## 2020-03-24 DIAGNOSIS — C78.00: ICD-10-CM

## 2020-03-24 DIAGNOSIS — R51: ICD-10-CM

## 2020-03-24 DIAGNOSIS — Z08: Primary | ICD-10-CM

## 2020-03-24 DIAGNOSIS — C24.1: ICD-10-CM

## 2020-03-24 DIAGNOSIS — M54.89: ICD-10-CM

## 2020-03-24 DIAGNOSIS — R51: Primary | ICD-10-CM

## 2020-03-24 NOTE — REP
MRI CERVICAL SPINE WITHOUT AND WITH IV GADOLINIUM:

 

HISTORY:  Metastatic ampullary carcinoma the pancreas.  Comparison is made with

today's brain MRI images.  No comparison cervical spine imaging.

 

TECHNIQUE:  Sagittal and axial T1 and T2-weighted scans are acquired in the usual

fashion with and without fat saturation.  Sequences include spin echo, turbo

spin-echo, and STIR imaging sequences.

 

GADOLINIUM ENHANCEMENT DOSE:   11 mL of intravenous ProHance.

 

Cervical vertebral body heights are preserved.  Alignment is normal.  No bony

destructive lesion is seen in the cervical spine.  The cervical cord is normal in

coarse, caliber and signal intensity on T1 and T2-weighted scans.

 

There is degenerative disc disease at the C5-6 disc level with disc space

narrowing, decreased disc space signal intensity, and diffuse posterior disc

bulging.  This bulging indents the ventral margin of the thecal sac and there is

mild bilateral uncovertebral spurring.  This is a little more prominent on the

left neural foramen than the right at C5-6.  No other focal disc protrusion or

significant degenerative disc disease is seen in the cervical spine.

 

Postcontrast images demonstrate a focus of apparent enhancement measuring 3-4 mm

in size in the right central gerda which cannot be appreciated on today's MRI

study.  The cervical spine images are slightly thinner sections and I suspect a

early enhancing metastasis in this location in the gerda.

 

IMPRESSION:

 

1.  Degenerative disc disease with diffuse disc bulging and mild bilateral neural

foraminal narrowing from uncovertebral spurring, left greater than right, at

C5-6.2.

 

2. There is a 3-4 mm focus of enhancement in the right central gerda noted

incidentally on C-spine images suggestive of a early enhancing metastatic focus.

This it is less well appreciated on brain MRI images.

 

 

Electronically Signed by

Yaya Chauhan MD 03/24/2020 03:35 P

## 2020-03-25 ENCOUNTER — HOSPITAL ENCOUNTER (OUTPATIENT)
Dept: HOSPITAL 53 - M RAD | Age: 55
End: 2020-03-25
Attending: INTERNAL MEDICINE
Payer: COMMERCIAL

## 2020-03-25 DIAGNOSIS — C79.51: ICD-10-CM

## 2020-03-25 DIAGNOSIS — C24.1: Primary | ICD-10-CM

## 2020-03-25 PROCEDURE — 72157 MRI CHEST SPINE W/O & W/DYE: CPT

## 2020-03-25 NOTE — REP
MRI THORACIC SPINE WITHOUT AND WITH IV GADOLINIUM:

 

HISTORY:  Metastatic ampullary carcinoma.  Comparison MRI thoracic spine January 29, 2020.

 

TECHNIQUE:  Sagittal and axial T1 and T2-weighted scans are acquired in the usual

fashion with and without fat saturation.  Sequences include spin echo, turbo

spin-echo, and STIR imaging sequences.

 

Gadolinium enhancement dose is 11 mL of intravenous ProHance.

 

MRI FINDINGS:  Thoracic vertebral body heights are preserved.  Alignment is

normal.  Extensive multifocal pulmonary metastatic disease is noted bilaterally

in the lungs.  This includes a 6.3 cm mass in the left lower lobe.

 

There is multifocal skeletal metastatic disease.  Lesions are visible today at

the T5, T6, T8, T12, and L2 levels.  The L2 metastatic focus is new from the

prior MRI study of the thoracic spine January 29, 2020.  The T12 vertebral body

lesion appears somewhat more extensive filling the vertebral body with low T1

signal intensity.  The T6 vertebral body lesion is larger when compared with the

prior study. There is some early ventral epidural disease with contrast

enhancement at the posterior cortex of the T12 and at the posterior cortex of the

L2 lesions consistent with early epidural extension.  No cord compression or

central canal stenosis is seen.

 

There are two left posterior rib lesions visible involving the left posterior 6th

rib and the left posterior 7th rib.  There is extensive extra osseous soft tissue

density at the 6th rib metastasis.  There is abnormal signal intensity indicating

a small metastatic focus on the left at T5 involving the transverse process.

There is also a small new lesion involving the spinous process of the T8

vertebral body. The T5 transverse process lesion, the posterior spinous process

lesion at T8, and the left 7th rib lesion are new when compared with the January

study.  The extraosseous soft tissue involvement around the left 6th rib

metastasis has progressed.

 

IMPRESSION:

 

Progressive skeletal metastatic disease with new sites of involvement in the L2

vertebral body, the left transverse process at T5, and spinous process at T8 ,

and the left 7th rib posteriorly.  There is mild but new ventral epidural

involvement at T12 and L2.  No cord compression is seen. The previously noted

lesions at T6, T12, and the left posterior 6th rib have progressed in the

interval since January 29, 2020.

 

 

Electronically Signed by

Yaya Chauhan MD 03/25/2020 11:28 A

## 2020-03-25 NOTE — RADONC
RADIATION ONCOLOGY  CONSULTATION NOTE

 

DATE:  03/24/2020

 

CHART NUMBER:  

 

DIAGNOSIS:  Adenocarcinoma of the ampulla.

 

STAGE:  IV, metastatic.

 

ECOG PERFORMANCE STATUS:  1

 

CONSULTATION NOTE:

Florence is a truly delightful 54-year-old white female, who is well-known to our

department as well as me personally for many years.

 

The patient has been treated by us 11 years ago for adenocarcinoma of the ampulla

and most recently completed a course of external beam radiation therapy on

02/24/2020 to her vertebral bodies from the levels of T5-C7 for a dose of 3000

cGy in 10 fractions of 300 cGy each.

 

More recently, the patient reports that she has been having headaches.  She says

they have been worsening over time.  MRIs of the brain as well as cervical spine

were done on 03/24/2020.  They revealed a 2.2 cm new heterogeneous enhancing mass

in the left occipital lobe with mild adjacent edema.  In addition, the CT of the

spine revealed a 3-4 mm focus in the right central gerda, which was also

suggestive of early metastatic disease.

 

The patient has initiated Decadron consisting of 4 mg b.i.d. and is presenting to

me for discussion of palliative radiation therapy to the brain.

 

REVIEW OF SYSTEMS:

The patient's review of systems is positive for her headaches.  She also is

continuing to have thoracic spine pain and is scheduled for another MRI of the

thoracic spine.  She reports that she stopped her narcotic pain medicine

oxycodone.  She stopped it because it caused her nausea and was significantly

interfering with her quality of life.

 

Overall the patient feels weak and reports that she is beginning to go downhill.

 

PHYSICAL EXAMINATION:

Physical examination was deferred secondary to COVID-19 precautions.

 

ASSESSMENT:

I have personally reviewed the MRI findings with our radiologist Dr. Chauhan.  I have

discussed with the patient the possibility of whole brain radiation therapy as a

therapeutic option.  We discussed logistics of treatment planning, simulation

subsequent fractionated daily radiation treatments.  I have scheduled her for

simulation as soon as possible.

 

In addition, I did discuss the possibility of gamma knife radiosurgery in

[place] with her.

 

In light of the patient's progressive and innumerable lung metastasis and overall

deterioration,  I believe starting her treatments a.s.a.p. will allow her to

finish her brain radiation so that chemotherapy can be reinitiated faster than

setting her first for gamma knife.

 

As planned, we will be treating the whole brain and then if all goes well repeat

an MRI in 2-3 months to see if there is any residual of the 2.2 cm lesion.  If

all is well at that point, we will get the expert opinion of possible gamma knife

radiosurgery if indicated to any residual.

 

Once again, the patient has had radiation on multiple occasions in the past and

is willing to undergo these treatments.  We will be coordinating her care with

her medical oncologist, Dr. Missy Matthew.

 

 

 

 

 

cc:    MD Tan Quiñones MD Karen Williams, MD

## 2020-03-26 NOTE — RADONC
RADIATION ONCOLOGY SIMULATION NOTE

 

DATE:  03/26/2020

 

CHART #:  

 

Ms. Cotter was taken to the CT scan for CT simulation of her brain field as well 
as

CT simulation of her lower spine field.  CT was accomplished without difficulty

or discomfort.  Radiation treatment planning is underway and radiation 
treatments

will begin subsequently.

 

Immobilization devices were created and were with created without difficulty or

discomfort.  They will be used throughout the course of treatment.

 

I was physically present throughout the course of CT simulation.



Addendum: I have reviewed all the patient's previous RT records.  The Lower

Thoracic and Lumbar spines were included in her original treatment fields

from 11 years ago.  The spinal cord as were as small bowel are at tolerance.

Therefore we are unable to treat the present spinal disease. 

[facility]

## 2020-03-26 NOTE — MEDONC
MEDICAL ONCOLOGY BRIEF URGENT VISIT

 

DATE OF SERVICE: 03/26/2020

 

DIAGNOSIS

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

dominant posterior thoracic metastasis, bone skeletal metastases

 

TREATMENT HISTORY

Please see 03/13/2020 note.

 

CURRENT THERAPY

Florence is scheduled to begin FOLFIRI, however, now postponed due to newly

discovered intracranial metastases, progressive thoracic spine metastases.

 

INTERVAL HISTORY

1. Florence asks if ibuprofen can be given NC (per rectum).  It cannot, but Toradol

is an option and intramuscular injection.  I checked with pharmacy, Sathish Gillis

felt more comfortable with a 30 rather than 60 mg dose due to Florence's intracranial

metastases and the risk of bleeding with high-dose Toradol.  I discussed with

Florence and Kamran starting the Toradol, seeing if it works.  We can continue it daily

while she is here for radiation.  She agreed to this.

 

2.  We then discussed the timing of chemotherapy.  Given she is starting

radiation and with Dr. Jim mccarthy, will hold off starting FOLFIRI

particularly during her first week.  We can consider concurrent treatment but

it's standard to defer chemotherapy until palliative radiation completed.

 

3.  She will follow up with Dr. Vandana Dunn next week for potential nerve

block as the thoracic mass will not be part of the radiation field.

 

4.  We discussed alternating with Tylenol if the 30 mg dose of Toradol does not

hold through the night, and she and she and I can discuss in the morning whether

this was adequate.

 

IMPRESSION

Recurrent ampullary adenocarcinoma with multifocal lung metastases, dominant

posterior thoracic mass, progressive thoracic spine and ribs metastases, new left

occipital mass causing ear pain and headache.

 

Inability to tolerate opioids due to nausea.

 

Persistent good performance status 0/1.

 

PLAN

1.  Florence is on dexamethasone 4 mg t.i.d. now, after the twice daily dosing was

insufficient to help her headache yesterday.

2.  Toradol 30 mg IM today.  If this offers good pain control will continue it

daily Monday through Friday on radiation days.  A delay must be introduced

between the fifth day and resumption se on weekends we will hold off on Toradol.

3.  Postpone start of FOLFIRI until at least the second week of radiation,

potentially after radiation completed.

4.  I later heard from Kamran the Toradol had helped Florence.  Will need to see how she

does overnight.  Possibly she could try Ultram though she strenuously wishes to

avoid opioids.

 

Time statement:  50 minutes face-to-face with the patient more than 50% involving

counseling, answering questions regarding the above issues.

 

 

 

 

Electronically Signed by

Missy Matthew MD 03/27/2020 04:21 P

 

 

 

 

 

 

 

 

DD:  Missy Matthew MD 03/26/2020 05:08 P

DT:  np 03/26/2020 10:29 P

 

CC:   MD Noam Mg MD Karen Williams, MD

## 2020-03-31 ENCOUNTER — HOSPITAL ENCOUNTER (OUTPATIENT)
Dept: HOSPITAL 53 - M ONCR | Age: 55
End: 2020-03-31
Attending: RADIOLOGY
Payer: COMMERCIAL

## 2020-03-31 DIAGNOSIS — C79.31: ICD-10-CM

## 2020-03-31 DIAGNOSIS — C79.51: Primary | ICD-10-CM

## 2020-03-31 PROCEDURE — 77307 TELETHX ISODOSE PLAN CPLX: CPT

## 2020-03-31 PROCEDURE — 77387 GUIDANCE FOR RADJ TX DLVR: CPT

## 2020-03-31 PROCEDURE — 77290 THER RAD SIMULAJ FIELD CPLX: CPT

## 2020-03-31 PROCEDURE — 77412 RADIATION TX DELIVERY LVL 3: CPT

## 2020-03-31 PROCEDURE — 77334 RADIATION TREATMENT AID(S): CPT

## 2020-04-03 ENCOUNTER — HOSPITAL ENCOUNTER (OUTPATIENT)
Dept: HOSPITAL 53 - M IRPRO | Age: 55
End: 2020-04-03
Attending: RADIOLOGY
Payer: COMMERCIAL

## 2020-04-03 VITALS — DIASTOLIC BLOOD PRESSURE: 71 MMHG | SYSTOLIC BLOOD PRESSURE: 122 MMHG

## 2020-04-03 DIAGNOSIS — C25.9: ICD-10-CM

## 2020-04-03 DIAGNOSIS — C79.89: Primary | ICD-10-CM

## 2020-04-03 PROCEDURE — 17264 DSTRJ MAL LES T/A/L 3.1-4.0: CPT

## 2020-04-03 PROCEDURE — 99152 MOD SED SAME PHYS/QHP 5/>YRS: CPT

## 2020-04-03 PROCEDURE — 99153 MOD SED SAME PHYS/QHP EA: CPT

## 2020-04-03 PROCEDURE — 77013 CT GUIDE FOR TISSUE ABLATION: CPT

## 2020-04-03 NOTE — REP
IR first CT guided soft tissue tumor Cryoablation.

 

IR second CT-guided soft tissue tumor cryoablation.

 

IR CT guided needle placement.

 

IR moderate sedation.

 

Clinical information:  Localized metastatic deposits on the left posterior thorax

causing intractable pain.  Metastatic pancreatic cancer.

 

Physician:  Dr. Ross.

 

Procedure:  The patient was advised of the benefits, risks and alternatives of

the procedure and informed consent was obtained.

 

The time-out was performed with verification of the patient's name, MRN, site of

procedure and type of procedure to be performed.  The patient was positioned in

the prone position on the CT table.  The site was prepped and draped in the usual

sterile fashion.

 

Moderate sedation was performed by the physician including the presence of an

independent trained observer who assisted in monitoring the patient's level of

consciousness and physiologic status.  Following the administration of Fentanyl

and Versed, the physician spent 90 minutes of continuous face to face time with

the patient.

 

Preliminary CT demonstrates an approximately 4 x2 cm enhancing mass, associated

with destructive changes in the posterior fifth left rib. A second smaller

enhancing lesion is also identified above this rib.

 

Tumor #1:

 

The skin and expected tract were anesthetized with lidocaine.  Using CT guidance,

a 17 gauge cryoablation probe was advanced into the target lesion with

intermittent CT guidance.

 

A 10-minute freeze cycle was then performed.  Into procedural CT demonstrates

complete coverage of the tumor.

 

An  8-minute thaw was then observed.

 

A second freeze cycle was then performed including 10-minute freeze.  This was

followed by an 8 minute thaw. The needle was removed.

 

Tumor #2

 

The skin and expected tract were anesthetized with lidocaine.  Using CT guidance,

a 17 gauge cryoablation probe was advanced into the second target lesion with

intermittent CT guidance.

 

A 10-minute freeze cycle was then performed.  Into procedural CT demonstrates

complete coverage of the tumor.

 

An  8-minute thaw was then observed.

 

A second freeze cycle was then performed including 10-minute freeze.  This was

followed by an 8 minute thaw.

 

The cryoablation probe was removed. Final CT demonstrates no significant hematoma

or pneumothorax.

 

The puncture site was cleansed and a sterile dressing was applied.

 

The patient tolerated the procedure well and was returned to PRU in stable

condition.

 

EBL:  Less than 5 ml.

 

Complications:  None.

 

Impression:

 

 

1.  CT demonstrates two lesions in the left posterior thorax associated with

adjacent rib destruction and pleural thickening.

 

2. Successful CT-guided cryoablation of two metastatic deposits in the left

posterior thorax.  Patient to follow up in IR clinic in 1 month.

 

Thank you this referral.

 

Cc Dr. Missy Cotter.

 

Cc Dr. Magaña.

 

 

Electronically Signed by

Allison Ross MD 04/03/2020 11:36 A

## 2020-04-03 NOTE — POST-OPPD
Postoperative Procedure Note


Date Of Procedure:  Apr 3, 2020


Time Of Procedure:  10:03


PREOPERATIVE DIAGNOSIS: metastatic pancreatic cancer. left posterior thoracic 

met causing intractable pain





POSTOPERATIVE DIAGNOSIS: same





FINDINGS: same





PROCEDURE: left posterior thoracic met cryoablation





SURGEON: federica





ANESTHESIA: mod sed





ESTIMATED BLOOD LOSS: < 5 ml





COMPLICATIONS: none





POSTOPERATIVE CONDITION: stable











MARIAN CASTELLANOS MD                Apr 3, 2020 10:05

## 2020-04-07 NOTE — RADONC
RADIATION ONCOLOGY PROGRESS NOTE:

 

DATE: 04/06/2020

 

CHART NUMBER:  [id number]

 

Ms. Cotter is presently at a dose of 1500 centigrade to her whole brain and is

tolerating treatments quite well at this point with no complaints related to her

radiation therapy.  She is having no significant difficulties with regards to 
the

brain.  She reports that since interventional radiology procedure, her back pain

has improved significantly as well although distal present.



Physical Examination was deferred due to Covid 19 precautions.

 

Ms. Cotter is still tolerating treatments quite well and radiation will continue 
as

scheduled.

MTDD

## 2020-04-08 NOTE — MEDONC
MEDICAL ONCOLOGY FOLLOWUP / TREATMENT VISIT

 

DATE OF SERVICE:  04/06/2020

 

DIAGNOSES:

Refractory recurrent ampullary adenocarcinoma, with multifocal lung metastases,

thoracic spine encroaching mass, newly detected intracranial metastases, skeletal

involvement.

 

CURRENT THERAPY:   Florence is here to begin palliative FOLFIRI, dose reduced.

Currently completing second week of palliative radiation to brain; T12 and L2

sites with question of ventral epidural disease not amendable to radiation due to

prior previously radiated field.

 

TREATMENT HISTORY:  Please see 03/13/2020 synopsis.

 

INTERIM HISTORY

Florence continues radiation.  She self reduced her Decadron to 2 mg (half 4 mg

tablet) twice daily.  Her headache is not gone but getting better.  She has

completely stopped any more narcotic based medications.  She tried but did not

tolerate Toradol beyond one dose and stopped that, it caused some much pain she

is in her kidneys.  She took oxycodone but stopped that also.  She underwent

radioablation to two posterior thoracic sites of rib erosion associated with the

large left lung mass and reports this helped somewhat.

 

Overall, she says her pain is a little better controlled currently.  We discussed

the radiation and the epidural site as not amendable to radiation.  I let her

know I have contacted neurosurgery at [facility] should she become symptomatic from

these sites.  I reach Dr. Simone Styles, he referred me to Giovanni Ordonez MD,

with whom I have yet to speak but plan to connect, I have his cell phone.

 

Lean and I talked over her starting the FOLFIRI.  She is concerned about side

effects, understandably as she had severe side effects previously but is willing

to go forward.  This is with her [facility] [place] doctor's recommendation, Dr. Garcia.

 

BRIEF REVIEW OF SYSTEMS:

Florence denies fevers, chills, new cough, new shortness of breath, new leg swelling

or cramping.  She has some back pain, which is unresolved but possibly slightly

better.  Her headache involving her left side of her head is somewhat better.

Her appetite remains poor.  She is doing her best taking calories.  No extended

spontaneous vomiting.  No dysphagia per se.

 

PHYSICAL EXAMINATION:

VITAL SIGNS:  Reviewed.  Weight 53 kg (down net 2 kg in just under a month),

temperature 97.5, blood pressure 114/86, heart rate 102, respiratory 20, O2 sat

98%.  Remainder of physical exam deferred due to COVID-19 of crisis and no acute

new physical signs or symptoms.

 

LABORATORY DATA:

WBC 19.9, hemoglobin 14.3, hematocrit 43, platelets 225, MCV 86.  Sodium 132,

potassium 3.3.  Remainder of electrolytes unremarkable.  Calcium 7.2, albumin

3.2, total bili 1.3, AST 39, ALT 54, alkaline phosphatase 317,  1118.

 

IMPRESSION:

1.  Refractory recurrent ampullary adenocarcinoma with skeletal, lung, and

intracranial metastases.

- now undergoing whole brain RT for left parietal and gerda foci.

- evidence of possible T12 and L2 epidural progression in a previously radiated

field not amendable to reradiation, requiring close followup and potential

neurosurgical intervention.

2.  Anorexia, fatigue associated with poor appetite; Florence agreed today to daily

IV fluids if she was willing to come in for them.

3.  Progressive , today will be day 1 palliative chemotherapy after many

months off systemic treatment.

 

PLAN:

1.  Day 1 cycle one.

2.  Daily IV fluids 1 liter normal saline; the patient may refuse.

3.  I will follow up with Dr. Ordonez of neuro  spine oncology surgery regarding

the T12-L2 sites.

4.  2-week interval return for day 1 cycle two FOLFIRI.

5.  Continue dexamethasone at least twice daily.

6.  I reached Dr. Ordonez of spine oncology at [facility]; his office will request

images and he may try for a televisit with Florence in next 24 hours; I alerted

Florence's  Kamran to expect the call.

 

 

 

 

Electronically Signed by

Missy Matthew MD 04/08/2020 04:13 P

 

 

 

 

 

 

 

 

DD:  Missy Matthew MD 04/08/2020 11:01 A

DT:  heather 04/08/2020 11:16 A

 

CC:   MD Noam Hughes MD Michael A. Galgano, MD Karen Williams, MD

## 2020-04-13 ENCOUNTER — HOSPITAL ENCOUNTER (OUTPATIENT)
Dept: HOSPITAL 53 - M ONCR | Age: 55
LOS: 17 days | End: 2020-04-30
Attending: RADIOLOGY
Payer: COMMERCIAL

## 2020-04-13 DIAGNOSIS — C79.31: Primary | ICD-10-CM

## 2020-04-13 DIAGNOSIS — C79.51: ICD-10-CM

## 2020-04-13 NOTE — RADONC
RADIATION ONCOLOGY TREATMENT SUMMARY

 

DATE:  04/13/2020

 

CHART NUMBER:  

 

DIAGNOSIS:  Adenocarcinoma carcinoma of the ampulla.

 

STAGE:  IV, metastatic.

 

ECOG PERFORMANCE STATUS:  2

 

TREATMENT SUMMARY:

Florence is a delightful 54-year-old white female with the diagnosis of widely

metastatic adenocarcinoma, who presented to me for consideration of palliative

radiation therapy to the brain for brain metastasis.

 

We treated the patient to her whole brain for a total dose of 3000 cGy delivered

in 10 fractions of 300 cGy each over 13 elapsed days from 03/31/2020 through

04/13/2020.  The patient's brain was treated on a linear accelerator utilizing a

6 MV photon beam via parallel opposed lateral vieira.

 

Ms. Cotter tolerated her treatments quite well and was able to complete therapy as

prescribed without interruption.

 

I have scheduled the patient to see me again in 1 month for further followup.

She will also continue to be followed by her other physicians as well.

 

She is presently undergoing systemic therapy with her medical oncologist, Dr. Missy Matthew.  Dr. Matthew is managing the patient's Decadron.

 

 

 

 

 

 

cc:    MD Tan Quiñones MD Karen Williams, MD

## 2020-05-13 ENCOUNTER — HOSPITAL ENCOUNTER (OUTPATIENT)
Dept: HOSPITAL 53 - M ONCR | Age: 55
End: 2020-05-13
Attending: RADIOLOGY
Payer: COMMERCIAL

## 2020-05-13 DIAGNOSIS — C24.1: Primary | ICD-10-CM

## 2020-05-13 DIAGNOSIS — C79.31: ICD-10-CM

## 2020-05-13 DIAGNOSIS — C79.51: ICD-10-CM

## 2020-05-18 NOTE — RADONC
RADIATION ONCOLOGY FOLLOWUP NOTE

 

DATE:  05/13/2020

 

This is a telemedicine visit.  The patient was informed of the risks including

security breech, technological failure, inability to perform a comprehensive

physical exam which could delay or prevent an accurate diagnosis, and potential

complications from treatment decisions rendered over a telemedicine platform.

The patient understands and consented to the use of telehealth services phone

only.

 

CHART #:  

 

DIAGNOSIS:  Adenocarcinoma of the ampulla.

 

STAGE:  IV, metastatic.

 

FOLLOWUP NOTE

Florence is a truly delightful 54-year-old white female who is presenting to us today

1 month post completion of palliative radiation therapy to her brain.  The

patient presents today reporting that she is doing quite well with no complaints

at this time related to her radiation therapy.  She is continuing with her

chemotherapy and reports that she has been quite weak.  She has no new

difficulties.

 

PHYSICAL EXAMINATION:

Deferred at this time as this was a telephone consultation.

 

ASSESSMENT:

The patient is continuing with her systemic therapy at this time with her medical

oncologist, Dr. Matthew.  In light of this, I have not set her up for any

follow-ups in our department.  We are available to her at anytime on a p.r.n.

basis.  She will soon be undergoing radiation scanning and evaluation to assess

the efficacy of her chemotherapy.

 

 

 

 

 

cc:    MD Galina Salgado MD

## 2020-06-09 ENCOUNTER — HOSPITAL ENCOUNTER (OUTPATIENT)
Dept: HOSPITAL 53 - M RAD | Age: 55
End: 2020-06-09
Attending: INTERNAL MEDICINE
Payer: COMMERCIAL

## 2020-06-09 DIAGNOSIS — C79.51: ICD-10-CM

## 2020-06-09 DIAGNOSIS — C24.1: Primary | ICD-10-CM

## 2020-06-09 PROCEDURE — 72158 MRI LUMBAR SPINE W/O & W/DYE: CPT

## 2020-06-09 PROCEDURE — 72157 MRI CHEST SPINE W/O & W/DYE: CPT

## 2020-06-09 NOTE — REP
MRI lumbar spine:  06/09/2020.

 

Indication:  Metastatic pancreatic carcinoma.

 

Technique:  Multiplanar short lone tear sequences of the lumbar spine were

performed including post gadolinium imaging.

 

Comparison:  There are no MRIs of the lumbar spine available for direct

comparison.  Comparison is made to thoracic spine MRI dated 03/25/2020.

 

Findings:

 

The abnormal signal and corresponding pathologic enhancement within the L1

vertebral body is more pronounced compared to the most recent study.  Abnormal

signal is additionally noted at each remaining lumbar vertebral level

particularly at L3.  There is associated pathologic enhancement within the L3

lesion.  There are no focal disc herniations.  Disc dessication, disc space

narrowing and disc bulging are present at L2/L3, L4/L5 and talus extend the

L3/L4.  No areas of pathologic gadolinium enhancement are present within the

spinal canal.  There are no areas of nerve root impingement/significant spinal

canal narrowing. No pathologic compression fractures are present.

 

Impression:

 

Multifocal metastases of the lumbar axial skeleton without compression fracture

or significant narrowing of the spinal canal.

 

 

Electronically Signed by

Sharif Anglin DO 06/09/2020 04:33 P

## 2020-06-09 NOTE — REP
MRI thoracic spine:  06/09/2020.

 

Indication:  Metastatic pancreatic carcinoma.

 

Technique:  Multiplanar short and long tier sequences of the thoracic spine were

performed including post gadolinium imaging.

 

Comparison:  03/25/2020.

 

Findings:

 

Compared to the study from 3 months earlier, there has been progression of the

axial skeletal metastases within the thoracic region.  There are new subtle areas

of abnormal signal now present within T7, T8, T9, T10 as well as T11.  The

abnormal signal within the T6 and T12 vertebrae is more pronounced with

associated gadolinium enhancement at these levels.  No pathologic compression

fracture is present.  No abnormal cord signal is detected.  There is no evidence

of cord compression or abnormal spinal canal gadolinium enhancement.  Multiple

pulmonary metastases are redemonstrated.

 

Impression:

 

Progression of the thoracic axial skeletal metastases without compression

fracture. No significant metastatic disease within the spinal canal.

 

 

Electronically Signed by

Sharif Anglin DO 06/09/2020 04:13 P

## 2020-06-10 ENCOUNTER — HOSPITAL ENCOUNTER (OUTPATIENT)
Dept: HOSPITAL 53 - M RAD | Age: 55
End: 2020-06-10
Attending: INTERNAL MEDICINE
Payer: COMMERCIAL

## 2020-06-10 DIAGNOSIS — C78.02: ICD-10-CM

## 2020-06-10 DIAGNOSIS — C79.51: ICD-10-CM

## 2020-06-10 DIAGNOSIS — C24.1: Primary | ICD-10-CM

## 2020-06-10 DIAGNOSIS — C77.2: ICD-10-CM

## 2020-06-10 DIAGNOSIS — J90: ICD-10-CM

## 2020-06-10 PROCEDURE — 74177 CT ABD & PELVIS W/CONTRAST: CPT

## 2020-06-10 PROCEDURE — 71260 CT THORAX DX C+: CPT

## 2020-06-11 NOTE — REP
Clinical:  Metastatic pancreatic carcinoma.

 

Technique:  Axial contrast enhanced images from the thoracic inlet to the upper

abdomen (followed by CT of the abdomen and pelvis) using 100 ml Isovue 370

intravenous contrast material.  Coronal and sagittal re-formations obtained.

 

Comparison:  03/12/2020.

 

Findings:

While innumerable scattered metastatic nodules, consolidations, and interstitial

lymphangitic spread is again appreciated, the most pronounced areas of metastatic

consolidation on the prior examination appear to be slightly decreased in size.

Specifically, the largest lesion in the medial left lower lobe now measures

approximately 4.4 x 4.7 cm maximal AP and transverse diameter and previously

measured 5.4 x 5.6 cm at the same level.  The largest lesion in the right upper

lobe now measures roughly 2.1 cm maximal AP diameter and previously measured 2.6

cm maximal AP diameter at the same level.  A small left pleural effusion is again

noted and essentially unchanged.  No significant bulky mediastinal or hilar

adenopathy is identified.  The tracheobronchial tree is relatively patent.  There

is no evidence for pneumothorax.  Thoracic aorta, pulmonary vasculature and

heart/pericardium remain relatively stable and grossly normal.  Right-sided

[id number] is again identified with tip in the SVC/right atrium.  Evaluation

of the musculoskeletal structures demonstrates increasing metastatic changes to

the T12 and L2 vertebral bodies as compared to prior examination without

pathologic compression fracture.

 

Impression:

1.  Innumerable metastatic lesions throughout the bilateral lung fields with the

most prominent areas of metastatic consolidation on current examination appearing

slightly improved / decreased in size as compared to prior study.

2.  Osseous metastatic lesions at T12 and L2 appear more progressive, but without

pathologic compression fracture noted.

 

 

Electronically Signed by

Tani Hayden MD 06/11/2020 07:11 A

## 2020-06-11 NOTE — REP
Clinical:  Pancreatic carcinoma.

 

Technique:  Axial contrast enhanced images from the lung bases to the pubic

symphysis using oral (per protocol) and 100 ml Isovue 370 intravenous contrast

material with delayed images of the abdomen as well as coronal and sagittal

re-formations.

 

Comparison:  03/12/2020.

 

Findings:

Minimal amount of pneumobilia is again noted and essentially unchanged.  Liver is

relatively normal and without evidence for metastatic disease.  Mild splenomegaly

again noted and without focal splenic lesion identified.  Chronic atrophic

appearance to the pancreas with chronic ductal dilatation and intraductal

calcification at the head of the pancreas remains unchanged.  Postsurgical

changes in the right upper quadrant consistent with prior Whipple procedure.

 

Bilateral adrenal glands and kidneys are stable and within normal limits.

 

The enteric system is without obstruction or acute inflammatory process mild

elements of submucosal thickening involving the sigmoid colon are again noted and

unchanged.

 

Evaluation of the pelvis demonstrates normal bladder and age-appropriate

uterus/adnexa.  No ascites.  No intraperitoneal or retroperitoneal adenopathy.

No free air.  Abdominal aorta and vasculature without aneurysm or dissection.

Musculoskeletal structures demonstrate degenerative changes along with

heterogeneous partially sclerotic deformities involving L1 and L2 as well as T12

consistent with progressive metastatic disease.  No associated pathologic

compression fracture.

 

Impression:

1.  Stable postsurgical changes in the upper abdomen along with small amount of

stable pneumobilia.

2.  Osseous metastatic changes involving L1, L2, and T12 without pathologic

compression fracture.

3.  No further evidence for metastatic disease or obvious recurrence involving

the abdomen and pelvis.

 

 

Electronically Signed by

Tani Hayden MD 06/11/2020 07:40 A

## 2020-08-27 ENCOUNTER — HOSPITAL ENCOUNTER (OUTPATIENT)
Dept: HOSPITAL 53 - M RAD | Age: 55
End: 2020-08-27
Attending: INTERNAL MEDICINE
Payer: COMMERCIAL

## 2020-08-27 DIAGNOSIS — J34.1: ICD-10-CM

## 2020-08-27 DIAGNOSIS — G93.6: Primary | ICD-10-CM

## 2020-08-27 DIAGNOSIS — C79.31: ICD-10-CM

## 2020-08-27 PROCEDURE — 70553 MRI BRAIN STEM W/O & W/DYE: CPT

## 2020-08-27 NOTE — REPVR
PROCEDURE INFORMATION: 

Exam: MR Head Without and With Contrast 

Exam date and time: 8/27/2020 12:01 PM 

Age: 54 years old 

Clinical indication: Condition or disease; History of cancer (specify primary 

cancer site): ; Primary cancer: Pancreatic; Additional info: Eval for brain 

mets 



TECHNIQUE: 

Imaging protocol: MR of the head without and with intravenous contrast. 

Contrast material: PROHANCE; Contrast volume: 10 ml; Contrast route: 

INTRAVENOUS (IV);  



COMPARISON: 

1. MRI-Brain W/O FOLL BY WITH 3/24/2020 11:56 AM 

2. MRI-Brain W/O FOLL BY WITH 11/13/2015 12:38:51 PM 



FINDINGS: 

Brain: No acute infarct identified on the diffusion-weighted imaging. The T2 

weighted imaging demonstrates a few punctate foci of increased signal intensity 

in the subcortical white matter most likely representing trace small vessel 

ischemic change. Decreased conspicuity of a medial left temporal region mass 

along the posterior aspect of the left tentorium. Difficult to determine 

whether this is parenchymal or extra-axial. This is currently best appreciated 

on the coronal imaging measuring 1.0 cm in transverse x 0.6 cm in craniocaudal 

dimension on image 24 of series 901. Previously on the coronal imaging this 

mass measured 1.5 x 2.0 cm. There is faint residual adjacent edema, decreased 

compared to prior. Faint blush of contrast enhancement in the right gerda 

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 

stable. No new enhancing intracranial lesions. 

Ventricles: The ventricles are stable in size. No ventriculomegaly. 

Bones/joints: Unremarkable. 

Sinuses: Retention cyst or polyp in the right maxillary sinus. 

Mastoid air cells: Trace bilateral mastoid effusions. 

Orbits: Unremarkable. 

Soft tissues: Unremarkable. 



IMPRESSION: 

Interval decrease in size of a left occipital region metastasis.  No new 

metastases identified.



Electronically signed by: Maria Eugenia Ross On 08/27/2020  13:52:06 PM

## 2020-09-16 ENCOUNTER — HOSPITAL ENCOUNTER (OUTPATIENT)
Dept: HOSPITAL 53 - M RAD | Age: 55
End: 2020-09-16
Attending: NEUROLOGICAL SURGERY
Payer: COMMERCIAL

## 2020-09-16 DIAGNOSIS — C41.2: Primary | ICD-10-CM

## 2020-09-16 DIAGNOSIS — R93.7: ICD-10-CM

## 2020-09-16 PROCEDURE — 72158 MRI LUMBAR SPINE W/O & W/DYE: CPT

## 2020-09-16 PROCEDURE — 72157 MRI CHEST SPINE W/O & W/DYE: CPT

## 2020-09-16 NOTE — REPVR
PROCEDURE INFORMATION: 

Exam: MR Thoracic Spine Without and With Contrast 

Exam date and time: 9/16/2020 2:22 PM 

Age: 54 years old 

Clinical indication: Condition or disease; Cancer, metastatic/secondary to 

thoracic bone; Patient HX: Ampullary CA with mets; Additional info: Malignant 

tumor spine 



TECHNIQUE: 

Imaging protocol: Multiplanar magnetic resonance images of the thoracic spine 

without and with intravenous contrast. 

Contrast material: PROHANCE; Contrast volume: 10 ml; Contrast route: 

INTRAVENOUS (IV);  



COMPARISON: 

MRI-T SPINE W/O FOLL WITH CON 6/9/2020 1:34 PM 



FINDINGS: 

Vertebrae: Abnormal signal within the T4 (to the left of midline and into 

spinous process), T5 (vertebral body into spinous process, T6 (to the right of 

midline), T7 (posteriorly into the left and into spinous process.), T8 (right 

pedicle) and T11 vertebral bodies. . There is a transitional vertebral body at 

S1. Epidural enhancement at T11.



Enhancing soft tissue surrounding the exiting nerve roots at T3-4 on the left, 

T4-5 bilaterally, T5-6 bilaterally, T6-7 bilaterally,  T7-8 to the left of 

midline and T11-12 particularly on the right

 Spinal cord: Normal signal. No cord compression. 

T1-T2:  No significant disc disease. No significant spinal canal stenosis. 

T2-T3:  No significant disc disease. No significant spinal canal stenosis. 

T3-T4:  No significant disc disease. No significant spinal canal stenosis. 

T4-T5:  No significant disc disease. No significant spinal canal stenosis. 

T5-T6:  No significant disc disease. No significant spinal canal stenosis. 

T6-T7:  No significant disc disease. No significant spinal canal stenosis. 

T7-T8:  Small right posterolateral disc bulge with mild narrowing right lateral 

recess. No significant spinal canal stenosis. 

T8-T9:  No significant disc disease. No significant spinal canal stenosis. 

T9-T10:  No significant disc disease. No significant spinal canal stenosis. 

T10-T11:  No significant disc disease. No significant spinal canal stenosis. 

T11-T12:  No significant disc disease. No significant spinal canal stenosis. 



Soft tissues: Unremarkable. 



IMPRESSION: 

1. Interval progression of metastatic disease to the thoracic spine. Stable 

epidural enhancement at T11. No evidence of cord compression. Enhancing soft 

tissue surrounding the exiting nerve root sleeves at several levels as 

described above.



Electronically signed by: Yuki Traylor On 09/16/2020  16:23:47 PM

## 2020-09-16 NOTE — REPVR
PROCEDURE INFORMATION: 

Exam: MR Lumbar Spine Without and With Contrast. 

Exam date and time: 9/16/2020 2:22 PM 

Age: 54 years old 

Clinical indication: Condition or disease; Cancer, metastatic (secondary site 

lumbar); Patient HX: Ampullary CA with mets; Additional info: Malignant tumor 

spine 



TECHNIQUE: 

Imaging protocol: Multiplanar magnetic resonance images of the lumbar spine 

without and with intravenous contrast. 

Contrast material: PROHANCE; Contrast volume: 10 ml; Contrast route: 

INTRAVENOUS (IV);  



COMPARISON: 

MRI-LS SPINE W/O FOLL WITH CON 6/9/2020 1:34 PM 



FINDINGS: 

Vertebra: Abnormal signal noted within the L1 and L3 vertebral body which has 

progressed since previous in this patient with known metastatic disease. 

Enhancement of present within the vertebral bodies on the contrast enhanced 

portion of the study. Loss of normal T2 weighted signal within the discs from 

L2 through L5 reflecting disc desiccation. 



Spinal cord: Normal signal. No cord compression. Conus posterior to T12.

L1-L2:  No significant disc disease. No significant spinal canal stenosis. No 

neural foraminal stenosis. 

L2-L3:  Circumferential annular tear.. No significant spinal canal stenosis. No 

neural foraminal stenosis. 

L3-L4:  No significant disc disease. Mild epidural enhancement Along the 

posterior margin of the L3 vertebral body that is unchanged. No significant 

spinal canal stenosis. No neural foraminal stenosis. 

L4-L5:  Circumferential annulus bulge. Mild enhancement of the endplates to the 

left of midline likely degenerative. Mild central stenosis.. No neural 

foraminal stenosis. 

L5-S1:  No significant disc disease. No significant spinal canal stenosis. No 

neural foraminal stenosis. 



Soft tissues: Unremarkable. 



IMPRESSION: 

1. Metastatic disease at L1 and L3 which has progressed within those vertebral 

bodies since previous . Stable epidural enhancement posterior to L3. No new 

lesions. No cord compression



2. Circumferential annulus bulge with annulus tear at L4-L5. No change 



Electronically signed by: Yuki Traylor On 09/16/2020  16:04:07 PM

## 2020-09-18 ENCOUNTER — HOSPITAL ENCOUNTER (OUTPATIENT)
Dept: HOSPITAL 53 - M RAD | Age: 55
End: 2020-09-18
Attending: INTERNAL MEDICINE
Payer: COMMERCIAL

## 2020-09-18 DIAGNOSIS — C79.51: Primary | ICD-10-CM

## 2020-09-18 DIAGNOSIS — C25.9: ICD-10-CM

## 2020-09-18 PROCEDURE — 71260 CT THORAX DX C+: CPT

## 2020-09-18 PROCEDURE — 74177 CT ABD & PELVIS W/CONTRAST: CPT

## 2020-09-25 ENCOUNTER — HOSPITAL ENCOUNTER (OUTPATIENT)
Dept: HOSPITAL 53 - M RAD | Age: 55
End: 2020-09-25
Attending: INTERNAL MEDICINE
Payer: COMMERCIAL

## 2020-09-25 DIAGNOSIS — R93.5: Primary | ICD-10-CM

## 2020-09-25 DIAGNOSIS — R10.2: ICD-10-CM

## 2020-09-25 DIAGNOSIS — Z85.068: ICD-10-CM

## 2020-09-29 NOTE — REP
HISTORY: Ampullary carcinoma with metastasis. 



COMPARISON: CT abdomen and pelvis 06/10/2020. 



CT CONTRAST DOSE: 100 mL of intravenous Isovue-370 is administered. 



CT FINDINGS: 

Digital preliminary  radiograph demonstrates an unremarkable bowel gas 
pattern. There is extensive pulmonary parenchymal disease. An Infusaport 
catheter is noted on the right. 



There is a small left pleural effusion. Multiple pulmonary nodules are noted 
bilaterally. These will be discussed in greater detail on the chest CT report. 



No focal liver or spleen lesion is visible today. The patient is status post 
Whipple procedure and there is stable mild pneumobilia. Surgical clips are noted
in the gallbladder fossa. There is mild diffuse pancreatic atrophy and 
mild-to-moderate dilation of the pancreatic duct unchanged from prior study. 
There is a small accessory splenule. Kidneys enhance symmetrically and are 
morphologically intact. No retroperitoneal mass or adenopathy is seen. Small and
large bowel loops are unremarkable. No mesenteric mass or adenopathy is seen. No
free fluid is noted. Uterus is tipped to the right and appears somewhat 
retroflexed. 



On bone window settings, the previously noted sclerotic metastatic sites are 
again seen in the T11, L1, and L3 vertebral bodies. The sclerotic changes 
associated with these lesions appear somewhat more extensive at these levels, 
but no new vertebral body level involvement is appreciated. There is an area of 
metastatic sclerosis change in the ischium on the left, which was not apparent 
on the 06/10/2020 study. This may be a small new metastatic site. This measures 
approximately 19 mm. There is a sclerotic lesion in the proximal superior pubic 
ramus on the left as well measuring 10 mm. This is stable.  



IMPRESSION: 

The blastic metastatic lesions involving T12, L1, and L3 appear a little more 
extensive, but there is no evidence of pathologic fracture. There is a 19 mm 
sclerotic lesion in the ischium, which was not previously apparent consistent 
with a new metastatic site. Otherwise, unchanged.

MTDD

## 2020-09-29 NOTE — REP
MRI PELVIS WITH AND WITHOUT CONTRAST 



HISTORY: Metastatic ampullary cancer. Pelvic pain. 



COMPARISON: CT 09/18/2020. 



TECHNIQUE: Multiple sequences obtained in the axial, coronal, and sagittal 
planes prior to and following the intravenous administration of 10 mL ProHance. 



FINDINGS: 

As suggested on the recent CT, there is a bone lesion in the left ischium, which
is hypointense on T1 and heterogeneous hyperintense on T2. This demonstrates 
heterogeneous enhancement with an element of periosteal enhancement. The 
findings are consistent with a metastatic bone lesion. This has a maximum 
diameter of about 3 cm. The suspected bone lesion in the left superior pubic 
ramus is also visualized, best visualization on T1-weighted images. This 
measures about 1 cm in diameter. A third metastatic lesion is seen in the left 
iliac bone medially, along the left sacroiliac joint. This demonstrates low 
signal on T1, high signal on T2, and demonstrates heterogeneous enhancement. 
This measures about 2 cm in diameter. No soft tissue masses are seen. There is 
no adenopathy in the pelvis. There is no other evidence of pelvic mass or free 
fluid. The uterus is small and deviated to the right of midline. I see no other 
abnormalities. 



Incidental note is made of a subcentimeter benign bone island in the right 
ischium.   



IMPRESSION: 

Three suspicious bone lesions most consistent with metastatic bone lesions, one 
is located in the left ischium measuring about 3 cm maximally, in the left 
superior pubic ramus measuring about 1 cm in diameter, and in the left iliac 
bone medially along the sacroiliac joint measuring about 2 cm in diameter.

MTDD

## 2020-09-29 NOTE — REP
CT CHEST WITH INTRAVENOUS (IV) CONTRAST 



HISTORY: Ampullary carcinoma with metastatic disease.



COMPARISON: Prior chest CT the most recent which is from 06/10/2020. 



CT CONTRAST DOSE: 100 mL of intravenous Isovue-370 is administered. 



CT FINDINGS: 

There is evidence of intrathoracic progressive disease. There is a slight 
increase in size of the small left pleural effusion. The largest confluent 
metastatic lesion in the left lower lobe posterobasal segment has increased to 
transverse diameter of 4.2 x 5.7 cm today, previously 4.4 x 4.7 cm. This opacity
has become confluent with an adjacent metastatic site, which is also enlarged 
currently measuring 2.8 x 2.5 cm, previously 2.5 x 2.3 cm. There are several of 
the smaller spiculated metastatic sites, which have become a little more solid 
in appearance. There are multiple metastatic nodules, which are larger today 
compared to the prior study. Many are unchanged. No definitely new metastatic 
lesion is seen; however, the lesions are innumerable. 



There is no evidence of hilar or mediastinal mass or adenopathy. No right 
pleural effusion is seen. 



On bone window settings, there are sclerotic metastatic lesions involving the 
T11, T7, and T5 vertebral body without vertebral collapse. The sclerotic change 
in the T11 vertebral body is a bit more extensive. There is more left posterior 
element sclerosis compared to the prior study. At T7, there is involvement of 
the spinous process and lamina with sclerosis. There is left pedicle involvement
at T7 as well. There is a fairly extensive area of sclerotic metastatic disease 
involving the posterior half of the T5 vertebral body. These changes are new. 
This extends into the posterior elements of T5. No collapse or obvious epidural 
extension is seen. There is considerable periosteal reaction and mineralization 
and sclerosis in the posterior left sixth and seventh ribs. I note that previous
cryotherapy has been performed here and this may be post ablation change. It is 
a change from the prior CT, however. 



There is a new sclerotic lesion in the right rib cage involving rib number six. 
 



IMPRESSION: 

There is evidence of progressive intrathoracic and skeletal disease.

MTDD

## 2020-10-08 ENCOUNTER — HOSPITAL ENCOUNTER (OUTPATIENT)
Dept: HOSPITAL 53 - M ONCR | Age: 55
End: 2020-10-08
Attending: RADIOLOGY
Payer: COMMERCIAL

## 2020-10-08 DIAGNOSIS — C79.51: ICD-10-CM

## 2020-10-08 DIAGNOSIS — C79.31: ICD-10-CM

## 2020-10-08 DIAGNOSIS — C24.1: Primary | ICD-10-CM

## 2020-10-08 NOTE — RADONC
Radiation Oncology Hx/FUP


Radiation Oncology Hx/FUP


Date of Service:  Oct 8, 2020


Pt Identifier


Florence Cotter is a 54 year old female seen for a followup visit today at the 

department of radiation oncology for a history of stage IV ampullary 

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

consideration of palliative RT for pain control.





Diagnosis/Treatment History


Oncologic History


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 

with Dr. Luis Stewart.


Adjuvant gemcitabine 11/2009-04/2010, interrupted with adjuvant combined 

chemoradiation to 45 Gy in 25 fractions with capecitabine February 2010.


History of pancreatitis and papillotomy (?) 2013.


October 2015 lung metastases.


Abraxane/gemcitabine 10/2015-05/2016.


Maintenance Abraxane 06/2016-08/2016.


Gemcitabine 08/2015-12/2016.


FOLFOX 01/2017-08/2017.


Treatment holiday 08/2017-01/2018.


FOLFOX 01/2018-11/2018.


FOLFIRI 11/2018 one cycle, poorly tolerated, discontinued.


FOLFOX 12/2018-03/2019.


Treatment holiday 03/2019-01/2020.


Disease progression with multifocal lung metastases and dominant large left 

posteromedial pleural-based mass up to 6.7 cm, in addition to T6 and T12 site 

seen on 01/29/2020 MRI, T11 metastasis on CT 02/02/2020.


[id number] following with Angel Cat MD at [facility] [place]


Palliative radiation T5-7, completed 02/25/2020.


Whole-brain radiation 03/31/2020-04/13/2020.


Thoracic cryoablation of L 6th rib metastatic focus 04/03/2020.


Palliative FOLFIRI chemotherapy, day one, cycle one 04/06/2020.  Major dose 

reduction due to prior difficulties tolerating this drug requiring G-CSF 

support.


Interval History


She reports that she is tolerating chemo well. Her energy level is poor. She is 

retired on permanent disability. She describes 2 dominant foci of pain, the left

hip which includes a constant pain in the buttock and lateral hip as well as a 

radiating component down the left leg. It is 7/10 and limits her activities and 

is exacerbated by movement. The other is in the left superior back between the 

shoulder blade and the spine, she describes a "deep pain" there in a rib for 

which she underwent cryoablation which reduced her pain initially however, the 

pain has recrudesced, and is now 5/10 constant aching. She has additional pain 

in the low back which is not functionally limiting dull and constant. She has 

tried several narcotics which do help pain but are too sedating or nauseating 

for her liking. She has no local dedicated pain-management provider.


Current Therapy


FOLFIRI palliative


Stage


Stage IV ampullary adenocarcinoma


Social History:  


Non-smoker


Non-drinker





Allergies / Meds


Allergies:  


Coded Allergies:  


     hydromorphone (Verified  Adverse Reaction, Intermediate, NAUSEA/VOMITING, 

3/29/19)


Home Meds


Active Scripts


Pantoprazole Sodium (Protonix) 40 Mg Tab, 40 MG PO BID, #180 TAB 3 Refills


   Prov:ESTER HERNANDEZ MD         8/24/20


Gabapentin (Neurontin) 100 Mg Capsule, 2 CAP PO TID for 30 Days, #180 CAP


   Prov:Missy Matthew MD         6/30/20


Pancreatic Enzymes (Creon  24,000 Units Capsule) 1 Each Capsule., 1 CAP PO 

TID for 90 Days, #270 CAP 5 Refills


   Prov:Missy Matthew MD         3/26/20


Reported Medications


Famotidine (Pepcid AC) 10 Mg Tablet, 10 MG PO DAILY for 30 Days, #30 TAB


   4/20/20


Acetaminophen (Acetaminophen) 500 Mg Tablet, 1500 MG PO Q6H PRN for PAIN, TAB


   4/3/20


Lactobacillus Acidophilus (Probiotic) 1 Each Capsule, 1 CAP PO DAILY, CAP


   1/31/20





Review of Systems


Review of Systems


Constitutional:  Reports: Weakness, Fatigue, Weight Loss; 


   Denies: Fever, Malaise, Night Sweats


Eyes:  Denies: Pain, Vision change


HEENT:  Denies: Head Aches, Sinus Congestion


Skin:  Denies: Rash


Pulmonary:  Denies: Dyspnea, Cough, Pleuritic Chest Pain


Cardiovascular:  Denies: Chest Pain, Palpitations


Gastrointestinal:  Denies: Nausea, Vomiting, Abdominal Pain


Genitourinary:  Denies: Dysuria, Frequency


Hematologic:  Reports: Bruising; 


   Denies: Petecchia


Endocrine:  Denies: Polydipsia, Polyphagia


Musculoskeletal:  Reports: Shoulder pain, Back pain, Leg pain, Midthoracic pain;




   Denies: Neck pain, Foot pain


Neurological:  Denies: Weakness, Numbness, Incoordination, Seizures


Psych:  Reports: Mood Normal; 


   Denies: Anxiety





Physical Examination


Vital Signs


Ht 66"


Weight 112 lb 


T 98


P 92


RR 20


/81


O2 92%


Fatigue 4


Pain 5


General Exam:  Positive: Alert, Cooperative, No Acute Distress


Eye Exam:  Positive: PERRLA, EOMI


ENT EXAM:  Positive: Atraumatic


Neck Exam:  Positive: Supple


Chest Exam:  Positive: Clear to auscultation


Heart Exam:  Positive: Rate Normal, Regular Rhythm


Abdomen Exam:  Positive: Normal bowel sounds, Soft; 


   Negative: Tenderness


Extremity Exam:  Negative: Edema


Skin Exam:  Positive: Nl turgor and temperature


Neuro Exam:  Positive: Normal Gait, Normal Speech, Strength at 5/5 X4 ext, 

Cranial Nerves 3-12 NL


Psych Exam:  Positive: Mental status NL, Mood NL


Other Physical Findings


There is mild TTP in the left hip soft tissues, there is no TTP over the length 

of the spine or in paraspinal soft tissues


Diagnostic and Laboratory


Diagnostic Review


Radiologic images, relevant labs and pathology reports were personally reviewed 

and discussed with Ms. Cotter.





Assessment and Plan


Impression


Assessment


Ms. Cotter is a 54 year old female seen for a followup visit today at the 

department of radiation oncology for a history of stage IV ampullary 

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

consideration of palliative RT for pain control.





She describes 2 dominant pain foci, the left hip region in which there are 

several lesions in the left ischium, sacroiliac, and pubic bones, and the left 

posterior 6th rib, previously cryoablated by Dr. Silveira. 





She also has disease in L1 and L3 vertebral bodies but I see no nerve root or 

spinal cord compression as such these are poor correlates for radicular symptoms

in the left leg. Rather I think her pain in the left leg is all due to the left 

hip regional disease. 





We discussed treating the left hip with palliative RT 20 Gy in 5 fractions with 

AP/PA fields. 





We also discussed treating the left posterior 6th and 7th ribs 20 Gy in 5 

fractions concurrently. She has prior RT consisting of a single PA field to the 

T5-7 vertebral bodies 30 Gy in 10 fractions ini portions of the volume it was 

120% hot. There will be some overlap of this previously treated field and the 

current target and out of need to respect spinal cord tolerance I will use VMAT 

to spare cord dose, this will also facilitate some lung sparing as well. I would

do a daily kV to localize the new field. 





We reviewed the possible side effects, fatigue, skin reaction and pain flare 

among others. 





She agrees to proceed. 





Will simulate her in the next week. 





We also discussed referral to palliative care here as she has no local pain-

management provider. She is amenable to this and I will facilitate referral. 





As a final note I reviewed her MRI brain from August given her history of brain 

metastases (ordered by Dr. Hernandez), I note no intracranial progression. I would 

recommend these be continued q3m while Ms. Cotter is still pursuing active cancer-

directed therapy.


Performance Status


ECOG 1


Plan


20 Gy in 5 fractions to the left hip and left posterior 6th & 7th ribs, the 

latter w/ VMAT due to direct overlap with previously treated T5-7 vertebral 

fields


Simulation in the next week


Referral to palliative care





Ms. Cotter was encouraged to call with questions or concerns in the interim 

period.











SG ARAUJO MD                Oct 8, 2020 10:35

## 2020-10-28 ENCOUNTER — HOSPITAL ENCOUNTER (OUTPATIENT)
Dept: HOSPITAL 53 - M ONCR | Age: 55
LOS: 3 days | End: 2020-10-31
Attending: RADIOLOGY
Payer: COMMERCIAL

## 2020-10-28 DIAGNOSIS — C79.51: Primary | ICD-10-CM

## 2020-12-11 ENCOUNTER — HOSPITAL ENCOUNTER (OUTPATIENT)
Dept: HOSPITAL 53 - M RAD | Age: 55
End: 2020-12-11
Attending: RADIOLOGY
Payer: COMMERCIAL

## 2020-12-11 ENCOUNTER — HOSPITAL ENCOUNTER (OUTPATIENT)
Dept: HOSPITAL 53 - M ONCR | Age: 55
End: 2020-12-11
Attending: RADIOLOGY
Payer: COMMERCIAL

## 2020-12-11 DIAGNOSIS — C79.31: ICD-10-CM

## 2020-12-11 DIAGNOSIS — C79.51: Primary | ICD-10-CM

## 2020-12-11 DIAGNOSIS — C24.1: Primary | ICD-10-CM

## 2020-12-11 DIAGNOSIS — C79.51: ICD-10-CM

## 2020-12-11 NOTE — RADONC
Radiation Oncology Hx/FUP


Radiation Oncology Hx/FUP


Date of Service:  Dec 11, 2020


Pt Identifier


Florence Cotter is a 55 year old female seen for a followup visit today at the 

department of radiation oncology for a history of stage IV ampullary 

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 

completed palliative RT to the left 6th & 7th rib and left hip 20 Gy in 5 

fractions completed on 10/28/20 who is seen today for consideration of 

palliative RT for pain control to additional painful sites.





Diagnosis/Treatment History


Oncologic History


From Dr. Hernandez's note:


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 

with Dr. Luis Stewart.


Adjuvant gemcitabine 11/2009-04/2010, interrupted with adjuvant combined 

chemoradiation to 45 Gy in 25 fractions with capecitabine February 2010.


History of pancreatitis and papillotomy (?) 2013.


October 2015 lung metastases.


Abraxane/gemcitabine 10/2015-05/2016.


Maintenance Abraxane 06/2016-08/2016.


Gemcitabine 08/2015-12/2016.


FOLFOX 01/2017-08/2017.


Treatment holiday 08/2017-01/2018.


FOLFOX 01/2018-11/2018.


FOLFIRI 11/2018 one cycle, poorly tolerated, discontinued.


FOLFOX 12/2018-03/2019.


Treatment holiday 03/2019-01/2020.


Disease progression with multifocal lung metastases and dominant large left 

posteromedial pleural-based mass up to 6.7 cm, in addition to T6 and T12 site 

seen on 01/29/2020 MRI, T11 metastasis on CT 02/02/2020.


[id number] following with Angel Cat MD at [facility] [place]


Palliative radiation T5-7, completed 02/25/2020.


Whole-brain radiation 03/31/2020-04/13/2020.


Thoracic cryoablation of L 6th rib metastatic focus 04/03/2020.


Palliative FOLFIRI chemotherapy, day one, cycle one 04/06/2020.  Major dose 

reduction due to prior difficulties tolerating this drug requiring G-CSF s

upport.


12/8/20 Decided to stop chemotherapy


Interval History


Notes she fell at home onto her butt yesterday. Does not think that she broke 

anything. Pain the left hip and left ribs has resolved post palliative RT. Pain 

is located in the right mid thoracic region starts in the back and radiates to 

the front, also located in the right side of the low back and hip bones. Pain 

has been 7-8/10 in recent days, but has been present for several weeks. For this

she is taking decadron and gabapentin, has morphine but makes her nauseous. 

Feels these medications are adequate. She is also taking medical marijuana as an

appetite stimulant and finds this helpful. She is following with palliative 

care. No plans for additional systemic therapy. Just focusing on pain control 

and comfort at this time.


Current Therapy


No cancer directed therapy


Stage


Stage IV ampullary adenocarcinoma


Social History:  


Non-smoker


Non-drinker





Allergies / Meds


Allergies:  


Coded Allergies:  


     hydromorphone (Verified  Adverse Reaction, Intermediate, NAUSEA/VOMITING, 

3/29/19)


Home Meds


Active Scripts


Dexamethasone (Dexamethasone) 2 Mg Tablet, 1-2 TAB PO DAILY, #50 TAB 2 Refills


   Prov:ESTER HERNANDEZ MD         12/8/20


Pantoprazole Sodium (Protonix) 40 Mg Tab, 40 MG PO BID, #180 TAB 3 Refills


   Prov:ESTER HERNANDEZ MD         8/24/20


Gabapentin (Neurontin) 100 Mg Capsule, 2 CAP PO TID for 30 Days, #180 CAP


   Prov:Missy Matthew MD         6/30/20


Pancreatic Enzymes (Creon Dr 24,000 Units Capsule) 1 Each Capsule., 1 CAP PO 

TID for 90 Days, #270 CAP 5 Refills


   Prov:Missy Matthew MD         3/26/20


Reported Medications


Famotidine (Pepcid AC) 10 Mg Tablet, 10 MG PO DAILY for 30 Days, #30 TAB


   4/20/20


Acetaminophen (Acetaminophen) 500 Mg Tablet, 1500 MG PO Q6H PRN for PAIN, TAB


   4/3/20


Lactobacillus Acidophilus (Probiotic) 1 Each Capsule, 1 CAP PO DAILY, CAP


   1/31/20





Review of Systems


Review of Systems


Constitutional:  Reports: Weakness, Fatigue; 


   Denies: Weight Loss


Eyes:  Denies: Pain, Vision change


HEENT:  Denies: Head Aches


Pulmonary:  Denies: Dyspnea, Cough


Cardiovascular:  Reports: Chest Pain; 


   Denies: Palpitations


Gastrointestinal:  Reports: Nausea; 


   Denies: Vomiting


Hematologic:  Denies: Bruising


Musculoskeletal:  Reports: Back pain, Midthoracic pain; 


   Denies: Neck pain, Joint pain


Neurological:  Denies: Weakness, Numbness


Psych:  Reports: Mood Normal





Physical Examination


Vital Signs


Ht 66"


Wt 103 lb


BMI 16


T 98


P 98


RR 18


/83


O2 95%


Pain 5


Fatigue 5


General Exam:  Positive: Alert, Cooperative, No Acute Distress, Other 

(Cachectic)


Eye Exam:  Positive: PERRLA, EOMI


ENT EXAM:  Positive: Atraumatic


Neck Exam:  Positive: Supple


Neuro Exam:  Positive: Normal Gait, Normal Speech, Normal Tone, Cranial Nerves 

3-12 NL


Psych Exam:  Positive: Mental status NL


Other Physical Findings


Musculoskeletal: There is tenderness in the posterior right ~7th rib location 

near insertion with the vertebral body, palpation did not elicit radiating pain 

anteriorly. There is tenderness in the region of the right SI joint as well as 

the right posterior iliac wing.


Diagnostic and Laboratory


Diagnostic Review


Radiologic images, relevant labs and pathology reports were personally reviewed 

and discussed with Ms. Cotter.





Assessment and Plan


Impression


Assessment


Ms. Cotter is a 55 year old female with a history of stage IV ampullary adenoc

arcinoma with extensive visceral and bony metastases, she recently completed 

palliative RT to the left 6th & 7th rib and left hip 20 Gy in 5 fractions 

completed on 10/28/20 who is seen today for consideration of palliative RT for 

pain control to additional painful sites. 





She has elected to discontinue systemic therapy due to side effects. She has two

main foci of pain which have been present for several weeks, right posterior 

mid-thoracic rib (7th?) and right SI joint area. I reviewed her CT simulation 

scans from 10/15/20 and do see a sclerotic metastasis in the lateral right 4th 

rib, unclear if this is involved or not, and she certainly could have developed 

a new lesion more corresponding with the site of tenderness on exam today. 

Similarly there is no obvious metastatic correlate for the site of her sacro-

iliac centered pain on 10/15/20 CT simulation or 9/25/20 MRI pelvis. 





We discussed moving forward with a planning session on the presumption of 

metastases at these sites. I will place a radio-opaque marker on the epicenters 

of pain and treat or not treat based on the planning CT. If there are lesions 

there I will give 20 Gy in 5 fractions to each site concurrently as we have done

before. 





I also will order a plain film AP pelvis to R/O fracture in the setting of 

recent fall at home. 





I offered referral to home care, she politely declined.


Performance Status


ECOG 1


Plan


Palliative RT to the right mid thoracic rib, right pelvis 20 Gy in 5 fractions


Simulation next week


AP pelvis film today due to fall





Ms. Cotter was encouraged to call with questions or concerns in the interim 

period.











SG ARAUJO MD               Dec 11, 2020 15:56

## 2020-12-11 NOTE — REP
INDICATION:

SECONDARY MALIGNANT NEOPLASM OF BONE.



COMPARISON:

Comparison radiographs November 22, 2009..  Comparison is made with more recent pelvic

MRI study September 25, 2020.



TECHNIQUE:

Single AP view of the pelvis.



FINDINGS:

There is a benign-appearing bone island in the right ischium.  There is a ill-defined

sclerotic lesion in the left ischium consistent with a skeletal metastatic site.  This

area measures approximately 4 cm in diameter.  It corresponds to the largest

metastatic lesion seen on September 25, 2020 MRI study.  There is also a sclerotic

lesion in the superior pubic ramus on the left.  This measures approximately 12 mm.

This was observed on MRI as well.  The left iliac lesion is less well seen

radiographically.  SI joints appear intact.  No other sclerotic or lytic skeletal

metastatic site is appreciated.



IMPRESSION:

Sclerotic metastatic foci seen in the left ischium and left superior pubic ramus.  No

evidence of pathologic fracture.





<Electronically signed by Russ Chauhan > 12/11/20 [id number]

## 2020-12-31 ENCOUNTER — HOSPITAL ENCOUNTER (OUTPATIENT)
Dept: HOSPITAL 53 - M ONCR | Age: 55
End: 2020-12-31
Attending: RADIOLOGY
Payer: COMMERCIAL

## 2020-12-31 DIAGNOSIS — C79.51: Primary | ICD-10-CM

## 2021-01-05 ENCOUNTER — HOSPITAL ENCOUNTER (OUTPATIENT)
Dept: HOSPITAL 53 - M ONCR | Age: 56
LOS: 26 days | End: 2021-01-31
Attending: RADIOLOGY
Payer: COMMERCIAL

## 2021-01-05 DIAGNOSIS — C79.51: Primary | ICD-10-CM

## 2021-01-06 ENCOUNTER — HOSPITAL ENCOUNTER (OUTPATIENT)
Dept: HOSPITAL 53 - M RAD | Age: 56
End: 2021-01-06
Attending: PHYSICIAN ASSISTANT
Payer: COMMERCIAL

## 2021-01-06 DIAGNOSIS — C41.2: Primary | ICD-10-CM

## 2021-01-06 PROCEDURE — 72158 MRI LUMBAR SPINE W/O & W/DYE: CPT

## 2021-01-06 PROCEDURE — 72157 MRI CHEST SPINE W/O & W/DYE: CPT

## 2021-01-06 NOTE — REP
INDICATION:

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



COMPARISON:

Comparison MRI study of the thoracic spine is from September 16, 2020.  June 9, 2020

prior study is also reviewed..



TECHNIQUE:

8 mL of intravenous ProHance is the contrast enhancement dose.  Pre and postcontrast

enhanced images are acquired including axial and sagittal T1 and T2 weighted scans

with without fat saturation.



FINDINGS:

There has been significant interval progression in this patient's skeletal metastatic

disease in the thoracic spine.  Anterior and posterior element fairly extensive

involvement is seen at each level from T3-T8 and from T10-T12.  In other words, the

only spared thoracic vertebrae are T1, T2, and T9.  There is now confluent metastatic

involvement of the anterior vertebral body at T5, T6, and T7 as well as at T11.  There

is dorsal epidural canal encroachment associated with the posterior element disease at

the T7 and T8 level with mild spinal cord compression.  There is enhancing epidural

thickening without obvious cord compression extending up as high as T4 and as low as

T8.  There is some dorsal and ventral epidural extension at the T11 level with thecal

sac narrowing but no angelia cord compression.  There is some left paravertebral soft

tissue extension at T11 and multiple extensive posterior rib lesions are seen

particularly on the left, most pronounced at the 11th, the 7th, the 5th, and 4th and

3rd posterior ribs on the left.  There is less extensive involvement of the right

posterior 7th and 8th posterior ribs.  There are extensive pulmonary parenchymal

nodular lesions consistent with widespread pulmonary metastasis.  These are more

extensive than on the June 9, 2020 and September 16, 2020 prior study.  No pathologic

fracture is seen.



IMPRESSION:

Progressive skeletal and pulmonary parenchymal metastatic disease as discussed in

detail above.  There is epidural disease encroaching on the spinal canal to some

degree from T4-T8.  There is evidence of mild cord compression due to this at the T7

and T8 level.





<Electronically signed by Russ Chauhan > 01/06/21 [id number]
